# Patient Record
Sex: FEMALE | Race: BLACK OR AFRICAN AMERICAN | NOT HISPANIC OR LATINO | Employment: FULL TIME | URBAN - METROPOLITAN AREA
[De-identification: names, ages, dates, MRNs, and addresses within clinical notes are randomized per-mention and may not be internally consistent; named-entity substitution may affect disease eponyms.]

---

## 2017-04-20 ENCOUNTER — OFFICE VISIT (OUTPATIENT)
Dept: INTERNAL MEDICINE | Facility: CLINIC | Age: 37
End: 2017-04-20

## 2017-04-20 VITALS
HEART RATE: 78 BPM | OXYGEN SATURATION: 98 % | WEIGHT: 235.13 LBS | SYSTOLIC BLOOD PRESSURE: 122 MMHG | TEMPERATURE: 97.1 F | DIASTOLIC BLOOD PRESSURE: 76 MMHG | RESPIRATION RATE: 20 BRPM

## 2017-04-20 DIAGNOSIS — M79.89 LEFT LEG SWELLING: ICD-10-CM

## 2017-04-20 DIAGNOSIS — R53.83 FATIGUE, UNSPECIFIED TYPE: ICD-10-CM

## 2017-04-20 DIAGNOSIS — M79.89 LEFT ARM SWELLING: Primary | ICD-10-CM

## 2017-04-20 DIAGNOSIS — Z13.220 LIPID SCREENING: ICD-10-CM

## 2017-04-20 LAB
EXPIRATION DATE: NORMAL
Lab: NORMAL
POC CREATININE URINE: 200
POC MICROALBUMIN URINE: 10

## 2017-04-20 PROCEDURE — 90471 IMMUNIZATION ADMIN: CPT | Performed by: INTERNAL MEDICINE

## 2017-04-20 PROCEDURE — 99214 OFFICE O/P EST MOD 30 MIN: CPT | Performed by: INTERNAL MEDICINE

## 2017-04-20 PROCEDURE — 90715 TDAP VACCINE 7 YRS/> IM: CPT | Performed by: INTERNAL MEDICINE

## 2017-04-20 PROCEDURE — 82044 UR ALBUMIN SEMIQUANTITATIVE: CPT | Performed by: INTERNAL MEDICINE

## 2017-04-20 NOTE — PROGRESS NOTES
Subjective   Salma Strickland is a 37 y.o. female.     History of Present Illness     Swelling in the left hand and left leg (new issue)  Duration 1 week   Patient says that she has been experiencing swelling in her left upper and lower extremities for the past 1 week.  She woke up one morning and noticed that her hand swollen (patient has provided features on her phone to document this) and she says that her hand felt tight.  She also noticed lower extremity swelling in her legs.  Patient denies any shortness of breath, chest pain, nausea, vomiting, fatigue, fever, chills, + she does mention headache during this time intermittently, but no association with any visual change, photophobia, phonophobia, or any other systemic symptoms.    Family History   Arthritis   No Lupus  No DVT disorders    Social history: No EtOH consumption, no cigarette smoking, no IV drugs, no marijuana.  + Patient recently started a new fruit cleanse drink.    Review of Systems   Constitutional: Positive for fatigue. Negative for appetite change, chills, diaphoresis and fever.   Respiratory: Negative for cough, chest tightness, shortness of breath, wheezing and stridor.    Neurological: Positive for headaches. Negative for dizziness, seizures, syncope, facial asymmetry, speech difficulty, weakness, light-headedness and numbness.       Objective   Physical Exam   Constitutional: She appears well-developed and well-nourished.   HENT:   Head: Normocephalic.   Right Ear: External ear normal.   Left Ear: External ear normal.   Nose: Nose normal.   Mouth/Throat: Oropharynx is clear and moist.   Eyes: Conjunctivae and EOM are normal. Pupils are equal, round, and reactive to light.   Neck: Normal range of motion. Neck supple.   Cardiovascular: Normal rate, regular rhythm and normal heart sounds.    Pulmonary/Chest: Effort normal and breath sounds normal.   Musculoskeletal: Normal range of motion.   Neurological: She is alert.   Skin: Skin is warm.    Psychiatric: She has a normal mood and affect. Her behavior is normal. Thought content normal.   Nursing note and vitals reviewed.      Assessment/Plan   Salma was seen today for leg swelling and headache.    Diagnoses and all orders for this visit:    Left arm swelling  -     EROS    Left leg swelling  -     POC Microalbumin  -     EROS    Lipid screening  -     Lipid Panel    Fatigue, unspecified type  -     CBC (No Diff)  -     Comprehensive Metabolic Panel  -     T4, Free  -     TSH    Symptoms are suggestive of possible positional i.e. lying down or hormonal and less likely risk for DVT.  However, I have instructed patient to continue to monitor symptoms for any worsening clinical symptoms such as shortness breath, chest pain, neurological deficits, for which she should go to the emergency room immediately.  Patient agree with plan.    Will continue to follow.

## 2017-04-21 LAB
ALBUMIN SERPL-MCNC: 4.2 G/DL (ref 3.2–4.8)
ALBUMIN/GLOB SERPL: 1.2 G/DL (ref 1.5–2.5)
ALP SERPL-CCNC: 85 U/L (ref 25–100)
ALT SERPL-CCNC: 17 U/L (ref 7–40)
ANA SER QL: NEGATIVE
AST SERPL-CCNC: 21 U/L (ref 0–33)
BILIRUB SERPL-MCNC: 0.4 MG/DL (ref 0.3–1.2)
BUN SERPL-MCNC: 11 MG/DL (ref 9–23)
BUN/CREAT SERPL: 13.8 (ref 7–25)
CALCIUM SERPL-MCNC: 9.6 MG/DL (ref 8.7–10.4)
CHLORIDE SERPL-SCNC: 106 MMOL/L (ref 99–109)
CHOLEST SERPL-MCNC: 166 MG/DL (ref 0–200)
CO2 SERPL-SCNC: 26 MMOL/L (ref 20–31)
CREAT SERPL-MCNC: 0.8 MG/DL (ref 0.6–1.3)
ERYTHROCYTE [DISTWIDTH] IN BLOOD BY AUTOMATED COUNT: 15 % (ref 11.3–14.5)
GLOBULIN SER CALC-MCNC: 3.5 GM/DL
GLUCOSE SERPL-MCNC: 91 MG/DL (ref 70–100)
HCT VFR BLD AUTO: 36.9 % (ref 34.5–44)
HDLC SERPL-MCNC: 45 MG/DL (ref 40–60)
HGB BLD-MCNC: 11.3 G/DL (ref 11.5–15.5)
LDLC SERPL CALC-MCNC: 114 MG/DL (ref 0–100)
MCH RBC QN AUTO: 23.3 PG (ref 27–31)
MCHC RBC AUTO-ENTMCNC: 30.6 G/DL (ref 32–36)
MCV RBC AUTO: 76.1 FL (ref 80–99)
PLATELET # BLD AUTO: 375 10*3/MM3 (ref 150–450)
POTASSIUM SERPL-SCNC: 4.8 MMOL/L (ref 3.5–5.5)
PROT SERPL-MCNC: 7.7 G/DL (ref 5.7–8.2)
RBC # BLD AUTO: 4.85 10*6/MM3 (ref 3.89–5.14)
SODIUM SERPL-SCNC: 139 MMOL/L (ref 132–146)
T4 FREE SERPL-MCNC: 0.85 NG/DL (ref 0.89–1.76)
TRIGL SERPL-MCNC: 34 MG/DL (ref 0–150)
TSH SERPL DL<=0.005 MIU/L-ACNC: 0.47 MIU/ML (ref 0.35–5.35)
VLDLC SERPL CALC-MCNC: 6.8 MG/DL
WBC # BLD AUTO: 6.15 10*3/MM3 (ref 3.5–10.8)

## 2017-04-22 DIAGNOSIS — R94.6 THYROID FUNCTION TEST ABNORMAL: Primary | ICD-10-CM

## 2017-04-30 DIAGNOSIS — R79.89 ABNORMAL THYROID BLOOD TEST: Primary | ICD-10-CM

## 2017-06-23 ENCOUNTER — TELEPHONE (OUTPATIENT)
Dept: INTERNAL MEDICINE | Facility: CLINIC | Age: 37
End: 2017-06-23

## 2017-06-23 NOTE — TELEPHONE ENCOUNTER
S/w patient informed her of information below she gave verbal understanding stated will come in for labs as soon as possible.

## 2017-06-28 ENCOUNTER — HOSPITAL ENCOUNTER (OUTPATIENT)
Dept: GENERAL RADIOLOGY | Facility: HOSPITAL | Age: 37
Discharge: HOME | End: 2017-06-28
Admitting: NURSE PRACTITIONER

## 2017-06-28 ENCOUNTER — OFFICE VISIT (OUTPATIENT)
Dept: INTERNAL MEDICINE | Facility: CLINIC | Age: 37
End: 2017-06-28

## 2017-06-28 VITALS
DIASTOLIC BLOOD PRESSURE: 84 MMHG | HEART RATE: 84 BPM | SYSTOLIC BLOOD PRESSURE: 136 MMHG | RESPIRATION RATE: 20 BRPM | TEMPERATURE: 98.2 F | WEIGHT: 232 LBS

## 2017-06-28 DIAGNOSIS — R79.89 ABNORMAL THYROID BLOOD TEST: ICD-10-CM

## 2017-06-28 DIAGNOSIS — M54.41 ACUTE MIDLINE LOW BACK PAIN WITH BILATERAL SCIATICA: Primary | ICD-10-CM

## 2017-06-28 DIAGNOSIS — M54.42 ACUTE MIDLINE LOW BACK PAIN WITH BILATERAL SCIATICA: Primary | ICD-10-CM

## 2017-06-28 LAB
T4 FREE SERPL-MCNC: 0.86 NG/DL (ref 0.89–1.76)
TSH SERPL DL<=0.005 MIU/L-ACNC: 0.07 MIU/ML (ref 0.35–5.35)

## 2017-06-28 PROCEDURE — 36415 COLL VENOUS BLD VENIPUNCTURE: CPT | Performed by: INTERNAL MEDICINE

## 2017-06-28 PROCEDURE — 72100 X-RAY EXAM L-S SPINE 2/3 VWS: CPT

## 2017-06-28 PROCEDURE — 96372 THER/PROPH/DIAG INJ SC/IM: CPT | Performed by: NURSE PRACTITIONER

## 2017-06-28 PROCEDURE — 99214 OFFICE O/P EST MOD 30 MIN: CPT | Performed by: NURSE PRACTITIONER

## 2017-06-28 RX ORDER — CYCLOBENZAPRINE HCL 10 MG
10 TABLET ORAL NIGHTLY
Qty: 30 TABLET | Refills: 0 | Status: SHIPPED | OUTPATIENT
Start: 2017-06-28 | End: 2017-11-29

## 2017-06-28 RX ORDER — METHYLPREDNISOLONE 4 MG/1
TABLET ORAL
Qty: 21 TABLET | Refills: 0 | Status: SHIPPED | OUTPATIENT
Start: 2017-06-28 | End: 2017-11-29

## 2017-06-28 RX ORDER — KETOROLAC TROMETHAMINE 30 MG/ML
60 INJECTION, SOLUTION INTRAMUSCULAR; INTRAVENOUS ONCE
Status: COMPLETED | OUTPATIENT
Start: 2017-06-28 | End: 2017-06-28

## 2017-06-28 RX ORDER — DEXAMETHASONE SODIUM PHOSPHATE 4 MG/ML
8 INJECTION, SOLUTION INTRA-ARTICULAR; INTRALESIONAL; INTRAMUSCULAR; INTRAVENOUS; SOFT TISSUE ONCE
Status: COMPLETED | OUTPATIENT
Start: 2017-06-28 | End: 2017-06-28

## 2017-06-28 RX ADMIN — DEXAMETHASONE SODIUM PHOSPHATE 8 MG: 4 INJECTION, SOLUTION INTRA-ARTICULAR; INTRALESIONAL; INTRAMUSCULAR; INTRAVENOUS; SOFT TISSUE at 11:46

## 2017-06-28 RX ADMIN — KETOROLAC TROMETHAMINE 60 MG: 30 INJECTION, SOLUTION INTRAMUSCULAR; INTRAVENOUS at 11:47

## 2017-06-28 NOTE — PATIENT INSTRUCTIONS
Back Pain, Adult  Back pain is very common in adults. The cause of back pain is rarely dangerous and the pain often gets better over time. The cause of your back pain may not be known. Some common causes of back pain include:  · Strain of the muscles or ligaments supporting the spine.  · Wear and tear (degeneration) of the spinal disks.  · Arthritis.  · Direct injury to the back.  For many people, back pain may return. Since back pain is rarely dangerous, most people can learn to manage this condition on their own.  HOME CARE INSTRUCTIONS  Watch your back pain for any changes. The following actions may help to lessen any discomfort you are feeling:  · Remain active. It is stressful on your back to sit or  one place for long periods of time. Do not sit, drive, or  one place for more than 30 minutes at a time. Take short walks on even surfaces as soon as you are able. Try to increase the length of time you walk each day.  · Exercise regularly as directed by your health care provider. Exercise helps your back heal faster. It also helps avoid future injury by keeping your muscles strong and flexible.  · Do not stay in bed. Resting more than 1-2 days can delay your recovery.  · Pay attention to your body when you bend and lift. The most comfortable positions are those that put less stress on your recovering back. Always use proper lifting techniques, including:    Bending your knees.    Keeping the load close to your body.    Avoiding twisting.  · Find a comfortable position to sleep. Use a firm mattress and lie on your side with your knees slightly bent. If you lie on your back, put a pillow under your knees.  · Avoid feeling anxious or stressed. Stress increases muscle tension and can worsen back pain. It is important to recognize when you are anxious or stressed and learn ways to manage it, such as with exercise.  · Take medicines only as directed by your health care provider. Over-the-counter  medicines to reduce pain and inflammation are often the most helpful. Your health care provider may prescribe muscle relaxant drugs. These medicines help dull your pain so you can more quickly return to your normal activities and healthy exercise.  · Apply ice to the injured area:    Put ice in a plastic bag.    Place a towel between your skin and the bag.    Leave the ice on for 20 minutes, 2-3 times a day for the first 2-3 days. After that, ice and heat may be alternated to reduce pain and spasms.  · Maintain a healthy weight. Excess weight puts extra stress on your back and makes it difficult to maintain good posture.  SEEK MEDICAL CARE IF:  · You have pain that is not relieved with rest or medicine.  · You have increasing pain going down into the legs or buttocks.  · You have pain that does not improve in one week.  · You have night pain.  · You lose weight.  · You have a fever or chills.  SEEK IMMEDIATE MEDICAL CARE IF:   · You develop new bowel or bladder control problems.  · You have unusual weakness or numbness in your arms or legs.  · You develop nausea or vomiting.  · You develop abdominal pain.  · You feel faint.     This information is not intended to replace advice given to you by your health care provider. Make sure you discuss any questions you have with your health care provider.     Document Released: 12/18/2006 Document Revised: 01/08/2016 Document Reviewed: 04/21/2015  [employer] Interactive Patient Education ©2017 [employer]

## 2017-06-28 NOTE — PROGRESS NOTES
Chief Complaint   Patient presents with   • Back Pain     x 3 days        Subjective     History of Present Illness   The patient is here today with complaints of back pain.  Past medical history of scoliosis and has been prepared with Rodriguez rods.  She reports that when she delivered her child 9 years ago she had an epidural had a reaction at that time to the epidural but no specific back pain.  The reaction seemed to be more related to the medication.  She reports 3 days ago she awoke to a sharp pain radiating across her back pain generating in the central portion of her back.  Pain shot down bilateral lower extremities.  She reports that she's had no known trauma to her back.  Moving makes it worse especially bending over and getting up while sitting makes it better especially on hard surface.  She's been using moist heat and NSAIDs which has been helping slightly.  She has had tingling in her right upper thigh.    She also is here to have thyroid test repeated for Dr. Gamez      The following portions of the patient's history were reviewed and updated as appropriate: allergies, current medications, past family history, past medical history, past social history, past surgical history and problem list.    Review of Systems  Weakness in lower extremities no fever no change in appetite no sweats back pain as noted above with pain radiating down lower extremities and tingling.  No change in bowel bladder habits  Objective   Physical Exam   Constitutional: She is oriented to person, place, and time. She appears well-developed and well-nourished.   Cardiovascular: Normal rate and regular rhythm.    Pulmonary/Chest: Effort normal and breath sounds normal.   Abdominal: Soft. Bowel sounds are normal.   Musculoskeletal:   Questionable straight leg raise positive she has tenderness across her lower back there is no tightness erythema or rash noted.  She can heel and toe walk however she has discomfort with movement.   Muscle strength 5 out of 5 in all major muscle groups of the lower extremities.   Neurological: She is alert and oriented to person, place, and time.   Skin: Skin is warm and dry.   Psychiatric: She has a normal mood and affect. Her behavior is normal.   Nursing note and vitals reviewed.      Results for orders placed or performed in visit on 06/28/17   T4, Free   Result Value Ref Range    Free T4 0.86 (L) 0.89 - 1.76 ng/dL   TSH   Result Value Ref Range    TSH 0.068 (L) 0.350 - 5.350 mIU/mL        Assessment/Plan   Salma was seen today for back pain.    Diagnoses and all orders for this visit:    Acute midline low back pain with bilateral sciatica  -     XR Spine Lumbar 2 or 3 View (In Office)  -     dexamethasone (DECADRON) injection 8 mg; Inject 2 mL into the shoulder, thigh, or buttocks 1 (One) Time.  -     ketorolac (TORADOL) injection 60 mg; Inject 60 mg into the shoulder, thigh, or buttocks 1 (One) Time.  -     MRI Lumbar Spine With & Without Contrast; Future    Abnormal thyroid blood test  -     T4, Free  -     TSH    Other orders  -     cyclobenzaprine (FLEXERIL) 10 MG tablet; Take 1 tablet by mouth Every Night.  -     MethylPREDNISolone (MEDROL, KAREN,) 4 MG tablet; Take as directed on package instructions.        IF MRI stable will do PT  Follow upFollow-up labs and imaging as made available  RTC/call  If symptoms worsen  Meds MOA and SE's reviewed and pt v/u

## 2017-07-03 ENCOUNTER — HOSPITAL ENCOUNTER (OUTPATIENT)
Dept: MRI IMAGING | Facility: HOSPITAL | Age: 37
Discharge: HOME | End: 2017-07-03
Admitting: NURSE PRACTITIONER

## 2017-07-03 DIAGNOSIS — M54.41 ACUTE MIDLINE LOW BACK PAIN WITH BILATERAL SCIATICA: ICD-10-CM

## 2017-07-03 DIAGNOSIS — M54.42 ACUTE MIDLINE LOW BACK PAIN WITH BILATERAL SCIATICA: ICD-10-CM

## 2017-07-03 PROCEDURE — A9577 INJ MULTIHANCE: HCPCS | Performed by: NURSE PRACTITIONER

## 2017-07-03 PROCEDURE — 72158 MRI LUMBAR SPINE W/O & W/DYE: CPT

## 2017-07-03 PROCEDURE — 0 GADOBENATE DIMEGLUMINE 529 MG/ML SOLUTION: Performed by: NURSE PRACTITIONER

## 2017-07-03 RX ADMIN — GADOBENATE DIMEGLUMINE 20 ML: 529 INJECTION, SOLUTION INTRAVENOUS at 09:45

## 2017-07-06 ENCOUNTER — TELEPHONE (OUTPATIENT)
Dept: INTERNAL MEDICINE | Facility: CLINIC | Age: 37
End: 2017-07-06

## 2017-07-06 DIAGNOSIS — M54.5 LOW BACK PAIN, UNSPECIFIED BACK PAIN LATERALITY, UNSPECIFIED CHRONICITY, WITH SCIATICA PRESENCE UNSPECIFIED: Primary | ICD-10-CM

## 2017-07-06 NOTE — TELEPHONE ENCOUNTER
----- Message from Yoon Lacey sent at 7/6/2017 12:51 PM EDT -----  PT CALLED WANTING THE RESULTS OF HER MRI. SHE CAN BE REACHED -[phone]

## 2017-07-06 NOTE — TELEPHONE ENCOUNTER
----- Message from Lisandra Sung MA sent at 7/6/2017  8:33 AM EDT -----  She had a MRI of Back done 3 days ago .  She would like the results    [phone]

## 2017-07-07 ENCOUNTER — TELEPHONE (OUTPATIENT)
Dept: INTERNAL MEDICINE | Facility: CLINIC | Age: 37
End: 2017-07-07

## 2017-07-07 RX ORDER — LEVOTHYROXINE SODIUM 0.05 MG/1
50 TABLET ORAL DAILY
Qty: 30 TABLET | Refills: 5 | Status: SHIPPED | OUTPATIENT
Start: 2017-07-07 | End: 2018-08-06 | Stop reason: SDUPTHER

## 2017-07-07 NOTE — TELEPHONE ENCOUNTER
----- Message from Yoon Lacey sent at 7/7/2017 10:33 AM EDT -----  PT CALLED AND STATED SHE AGREEABLE TO START MEDICATION FOR HER THYROID AND WANTED TO KNOW WHAT TO START PER LAB LETTER. SHE CAN BE REACHED -[phone]      [facility]

## 2017-07-07 NOTE — TELEPHONE ENCOUNTER
OK,     Please call in Synthroid 50 mcg po qd       #30      5refills        Follow-up back in clinic in approximately 6 weeks.

## 2017-07-10 ENCOUNTER — TELEPHONE (OUTPATIENT)
Dept: INTERNAL MEDICINE | Facility: CLINIC | Age: 37
End: 2017-07-10

## 2017-07-10 NOTE — TELEPHONE ENCOUNTER
----- Message from Yoon Lacey sent at 7/10/2017  1:10 PM EDT -----  Pt dropped off la papers. She can be reached at [phone]

## 2017-07-13 NOTE — TELEPHONE ENCOUNTER
Forms completed and faxed to [employer] [phone].  LMOV [phone] letting pt know. Asked for return call for her to let me know if she would like originals paperwork sent to her home address or place up front for her to p/u.

## 2017-08-03 ENCOUNTER — TELEPHONE (OUTPATIENT)
Dept: INTERNAL MEDICINE | Facility: CLINIC | Age: 37
End: 2017-08-03

## 2017-08-03 NOTE — TELEPHONE ENCOUNTER
----- Message from Gay Armstrong sent at 8/3/2017  2:35 PM EDT -----  [phone]    PT CALLED TO SEE IF [facility] PAPERS HAD BEEN FAXED.  PLEASE CALL AND LET HER KNOW

## 2017-08-05 NOTE — TELEPHONE ENCOUNTER
I have looked everywhere for these in the late papers and do not see them on my desk.    Please have them refax [facility] papers

## 2017-08-07 ENCOUNTER — TELEPHONE (OUTPATIENT)
Dept: INTERNAL MEDICINE | Facility: CLINIC | Age: 37
End: 2017-08-07

## 2017-08-07 NOTE — TELEPHONE ENCOUNTER
----- Message from Annamaria Jenkins sent at 8/3/2017 11:41 AM EDT -----  Patient states her FMLA has been denied due to the answers we put on her paperwork.  Wants us to either resubmit her paperwork or call [facility] and give information verbally.  Patient states we should have that info in her chart.  Patient can be reached at [phone]

## 2017-08-07 NOTE — TELEPHONE ENCOUNTER
Spoke with Pt and she states this was being faxed to MIRELLA Gonsalez as she is the one seeing the Pt. Suellen, please contact Pt with status.

## 2017-08-07 NOTE — TELEPHONE ENCOUNTER
Spoke with pt. States that her initial [facility] paperwork was denied due to not having a specific time period that she was off of work. States that time frame needs to be written as 06/23/2017 to 07/05/2017.

## 2017-08-08 NOTE — TELEPHONE ENCOUNTER
Question.  Does pt have pt and the dates are denied and need to be place for the specific dates she has asked or do we have papers???

## 2017-08-16 ENCOUNTER — TELEPHONE (OUTPATIENT)
Dept: INTERNAL MEDICINE | Facility: CLINIC | Age: 37
End: 2017-08-16

## 2017-08-17 NOTE — TELEPHONE ENCOUNTER
Forms faxed to [facility] [employer]. Confirmation received. LMOV [phone] letting pt know. Office # given for any further questions/ concerns.

## 2017-11-29 ENCOUNTER — OFFICE VISIT (OUTPATIENT)
Dept: INTERNAL MEDICINE | Facility: CLINIC | Age: 37
End: 2017-11-29

## 2017-11-29 VITALS
TEMPERATURE: 98 F | HEART RATE: 82 BPM | SYSTOLIC BLOOD PRESSURE: 118 MMHG | WEIGHT: 222 LBS | DIASTOLIC BLOOD PRESSURE: 76 MMHG | BODY MASS INDEX: 32.78 KG/M2 | RESPIRATION RATE: 18 BRPM

## 2017-11-29 DIAGNOSIS — R68.89 FLU-LIKE SYMPTOMS: Primary | ICD-10-CM

## 2017-11-29 DIAGNOSIS — J10.1 INFLUENZA A: ICD-10-CM

## 2017-11-29 LAB
EXPIRATION DATE: NORMAL
FLUAV AG NPH QL: POSITIVE
FLUBV AG NPH QL: NEGATIVE
INTERNAL CONTROL: NORMAL
Lab: NORMAL

## 2017-11-29 PROCEDURE — 87804 INFLUENZA ASSAY W/OPTIC: CPT | Performed by: NURSE PRACTITIONER

## 2017-11-29 PROCEDURE — 99213 OFFICE O/P EST LOW 20 MIN: CPT | Performed by: NURSE PRACTITIONER

## 2017-11-29 RX ORDER — LEVOTHYROXINE SODIUM 0.05 MG/1
50 TABLET ORAL DAILY
COMMUNITY
End: 2021-10-06

## 2017-11-29 RX ORDER — OSELTAMIVIR PHOSPHATE 75 MG/1
75 CAPSULE ORAL 2 TIMES DAILY
Qty: 10 CAPSULE | Refills: 0 | Status: SHIPPED | OUTPATIENT
Start: 2017-11-29 | End: 2018-04-03

## 2018-04-03 ENCOUNTER — OFFICE VISIT (OUTPATIENT)
Dept: INTERNAL MEDICINE | Facility: CLINIC | Age: 38
End: 2018-04-03

## 2018-04-03 VITALS
DIASTOLIC BLOOD PRESSURE: 90 MMHG | TEMPERATURE: 98 F | SYSTOLIC BLOOD PRESSURE: 128 MMHG | WEIGHT: 232 LBS | BODY MASS INDEX: 34.26 KG/M2 | RESPIRATION RATE: 18 BRPM | HEART RATE: 64 BPM

## 2018-04-03 DIAGNOSIS — J01.90 ACUTE SINUSITIS, RECURRENCE NOT SPECIFIED, UNSPECIFIED LOCATION: Primary | ICD-10-CM

## 2018-04-03 DIAGNOSIS — J02.9 SORE THROAT: ICD-10-CM

## 2018-04-03 DIAGNOSIS — R05.9 COUGH: ICD-10-CM

## 2018-04-03 LAB
EXPIRATION DATE: NORMAL
EXPIRATION DATE: NORMAL
FLUAV AG NPH QL: NEGATIVE
FLUBV AG NPH QL: NEGATIVE
INTERNAL CONTROL: NORMAL
INTERNAL CONTROL: NORMAL
Lab: NORMAL
Lab: NORMAL
S PYO AG THROAT QL: NEGATIVE

## 2018-04-03 PROCEDURE — 87880 STREP A ASSAY W/OPTIC: CPT | Performed by: NURSE PRACTITIONER

## 2018-04-03 PROCEDURE — 87804 INFLUENZA ASSAY W/OPTIC: CPT | Performed by: NURSE PRACTITIONER

## 2018-04-03 PROCEDURE — 99214 OFFICE O/P EST MOD 30 MIN: CPT | Performed by: NURSE PRACTITIONER

## 2018-04-03 RX ORDER — AMOXICILLIN AND CLAVULANATE POTASSIUM 875; 125 MG/1; MG/1
1 TABLET, FILM COATED ORAL EVERY 12 HOURS SCHEDULED
Qty: 20 TABLET | Refills: 0 | Status: SHIPPED | OUTPATIENT
Start: 2018-04-03 | End: 2021-10-06

## 2018-04-03 RX ORDER — BENZONATATE 100 MG/1
100 CAPSULE ORAL 3 TIMES DAILY PRN
Qty: 21 CAPSULE | Refills: 0 | Status: SHIPPED | OUTPATIENT
Start: 2018-04-03 | End: 2021-10-06

## 2018-04-03 NOTE — PROGRESS NOTES
Subjective:    Salma Strickland is a 38 y.o. female.     Chief Complaint   Patient presents with   • Cough   • Nasal Congestion   • Sore Throat       History of Present Illness   Patient complains of cough, congestion and sore throat that began Friday afer work. Was miserable with body aches and pounding headache. Reports cough drops and drinking Theraflu gave some relief.   Still has runny nose, sore throat and cough-productive with thick yellow mucus. Cough has persisted all day and night since Friday. Intermittent fever relieved by acetaminophen. Swallowing worsens constant throat pain-no relief identified. Hot tea has not helped. Headache resolved on Sunday. Patient states she is going on vacation this weekend and needs to feel better.    Current Outpatient Prescriptions:   •  levothyroxine (SYNTHROID, LEVOTHROID) 50 MCG tablet, Take 50 mcg by mouth Daily., Disp: , Rfl:   •  metFORMIN (GLUCOPHAGE) 500 MG tablet, Take 500 mg by mouth 2 (Two) Times a Day With Meals., Disp: , Rfl:   •  amoxicillin-clavulanate (AUGMENTIN) 875-125 MG per tablet, Take 1 tablet by mouth Every 12 (Twelve) Hours., Disp: 20 tablet, Rfl: 0  •  benzonatate (TESSALON PERLES) 100 MG capsule, Take 1 capsule by mouth 3 (Three) Times a Day As Needed for Cough., Disp: 21 capsule, Rfl: 0     The following portions of the patient's history were reviewed and updated as appropriate: allergies, current medications, past family history, past medical history, past social history, past surgical history and problem list.    Review of Systems   Constitutional: Positive for chills, fatigue and fever.   HENT: Positive for congestion, rhinorrhea, sinus pressure, sneezing and sore throat. Negative for ear pain and postnasal drip.    Eyes: Negative for pain, discharge, redness and itching.   Respiratory: Positive for cough. Negative for chest tightness, shortness of breath and wheezing.    Cardiovascular: Negative for chest pain.   Gastrointestinal: Negative  for abdominal pain, diarrhea, nausea and vomiting.   Endocrine: Negative.    Genitourinary: Negative.    Musculoskeletal: Negative for arthralgias and myalgias.   Skin: Negative for rash.   Allergic/Immunologic: Negative.    Neurological: Negative for headaches.   Hematological: Negative for adenopathy.   Psychiatric/Behavioral: Negative.        Objective:    /90 (BP Location: Left arm, Patient Position: Sitting)   Pulse 64   Temp 98 °F (36.7 °C) (Temporal Artery )   Resp 18   Wt 105 kg (232 lb)   BMI 34.26 kg/m²     Physical Exam   Constitutional: She is oriented to person, place, and time. She appears well-developed and well-nourished.   HENT:   Head: Normocephalic and atraumatic.   Right Ear: Tympanic membrane, external ear and ear canal normal.   Left Ear: Tympanic membrane, external ear and ear canal normal.   Nose: Mucosal edema present. Right sinus exhibits frontal sinus tenderness. Right sinus exhibits no maxillary sinus tenderness. Left sinus exhibits frontal sinus tenderness. Left sinus exhibits no maxillary sinus tenderness.   Mouth/Throat: Mucous membranes are normal. No oral lesions. Posterior oropharyngeal erythema present.   Nasal congestion.   Eyes: Conjunctivae and lids are normal.   Neck: Normal range of motion. Neck supple.   Cardiovascular: Normal rate and regular rhythm.    No murmur heard.  Pulmonary/Chest: Effort normal and breath sounds normal.   Non productive cough.   Abdominal: Soft. There is no hepatosplenomegaly. There is no tenderness.   Musculoskeletal: Normal range of motion.   Lymphadenopathy:     She has no cervical adenopathy.   Neurological: She is alert and oriented to person, place, and time. She has normal strength.   Skin: Skin is warm, dry and intact. No rash noted.   Psychiatric: She has a normal mood and affect. Her speech is normal and behavior is normal. Thought content normal.   Nursing note and vitals reviewed.      Assessment/Plan:    Salma was seen today  for cough, nasal congestion and sore throat.    Diagnoses and all orders for this visit:    Acute sinusitis, recurrence not specified, unspecified location  -     amoxicillin-clavulanate (AUGMENTIN) 875-125 MG per tablet; Take 1 tablet by mouth Every 12 (Twelve) Hours.    Cough  -     benzonatate (TESSALON PERLES) 100 MG capsule; Take 1 capsule by mouth 3 (Three) Times a Day As Needed for Cough.  -     POC Influenza A / B    Sore throat  -     POC Rapid Strep A    Discussed negative strep and flu test.    Return if symptoms worsen or fail to improve.

## 2018-08-06 RX ORDER — LEVOTHYROXINE SODIUM 0.05 MG/1
TABLET ORAL
Qty: 30 TABLET | Refills: 5 | Status: SHIPPED | OUTPATIENT
Start: 2018-08-06 | End: 2021-10-06

## 2021-10-06 ENCOUNTER — OFFICE VISIT (OUTPATIENT)
Dept: INTERNAL MEDICINE | Facility: CLINIC | Age: 41
End: 2021-10-06

## 2021-10-06 ENCOUNTER — LAB (OUTPATIENT)
Dept: LAB | Facility: HOSPITAL | Age: 41
End: 2021-10-06

## 2021-10-06 VITALS
BODY MASS INDEX: 31.71 KG/M2 | RESPIRATION RATE: 20 BRPM | DIASTOLIC BLOOD PRESSURE: 90 MMHG | TEMPERATURE: 97.8 F | WEIGHT: 221.5 LBS | HEIGHT: 70 IN | OXYGEN SATURATION: 99 % | SYSTOLIC BLOOD PRESSURE: 130 MMHG | HEART RATE: 72 BPM

## 2021-10-06 DIAGNOSIS — Z13.1 DIABETES MELLITUS SCREENING: ICD-10-CM

## 2021-10-06 DIAGNOSIS — M79.89 RIGHT LEG SWELLING: Primary | ICD-10-CM

## 2021-10-06 DIAGNOSIS — R13.19 ESOPHAGEAL DYSPHAGIA: ICD-10-CM

## 2021-10-06 LAB
A/C: NORMAL
ALBUMIN SERPL-MCNC: 4.4 G/DL (ref 3.5–5.2)
ALBUMIN/GLOB SERPL: 1.3 G/DL
ALP SERPL-CCNC: 105 U/L (ref 39–117)
ALT SERPL W P-5'-P-CCNC: 11 U/L (ref 1–33)
ANION GAP SERPL CALCULATED.3IONS-SCNC: 7.3 MMOL/L (ref 5–15)
AST SERPL-CCNC: 14 U/L (ref 1–32)
BILIRUB SERPL-MCNC: 0.2 MG/DL (ref 0–1.2)
BUN SERPL-MCNC: 9 MG/DL (ref 6–20)
BUN/CREAT SERPL: 10.3 (ref 7–25)
CALCIUM SPEC-SCNC: 9.1 MG/DL (ref 8.6–10.5)
CHLORIDE SERPL-SCNC: 105 MMOL/L (ref 98–107)
CHOLEST SERPL-MCNC: 160 MG/DL (ref 0–200)
CO2 SERPL-SCNC: 26.7 MMOL/L (ref 22–29)
CREAT SERPL-MCNC: 0.87 MG/DL (ref 0.57–1)
DEPRECATED RDW RBC AUTO: 36.5 FL (ref 37–54)
ERYTHROCYTE [DISTWIDTH] IN BLOOD BY AUTOMATED COUNT: 13.6 % (ref 12.3–15.4)
EXPIRATION DATE: NORMAL
GFR SERPL CREATININE-BSD FRML MDRD: 87 ML/MIN/1.73
GLOBULIN UR ELPH-MCNC: 3.3 GM/DL
GLUCOSE BLDC GLUCOMTR-MCNC: 89 MG/DL (ref 70–130)
GLUCOSE SERPL-MCNC: 76 MG/DL (ref 65–99)
HBA1C MFR BLD: 5.4 %
HCT VFR BLD AUTO: 35.9 % (ref 34–46.6)
HDLC SERPL-MCNC: 40 MG/DL (ref 40–60)
HGB BLD-MCNC: 11.3 G/DL (ref 12–15.9)
LDLC SERPL CALC-MCNC: 111 MG/DL (ref 0–100)
LDLC/HDLC SERPL: 2.79 {RATIO}
Lab: NORMAL
MCH RBC QN AUTO: 23.5 PG (ref 26.6–33)
MCHC RBC AUTO-ENTMCNC: 31.5 G/DL (ref 31.5–35.7)
MCV RBC AUTO: 74.6 FL (ref 79–97)
PLATELET # BLD AUTO: 386 10*3/MM3 (ref 140–450)
PMV BLD AUTO: 9.9 FL (ref 6–12)
POC CREATININE URINE: NORMAL
POC MICROALBUMIN URINE: NORMAL
POTASSIUM SERPL-SCNC: 3.5 MMOL/L (ref 3.5–5.2)
PROT SERPL-MCNC: 7.7 G/DL (ref 6–8.5)
RBC # BLD AUTO: 4.81 10*6/MM3 (ref 3.77–5.28)
SODIUM SERPL-SCNC: 139 MMOL/L (ref 136–145)
TRIGL SERPL-MCNC: 43 MG/DL (ref 0–150)
VLDLC SERPL-MCNC: 9 MG/DL (ref 5–40)
WBC # BLD AUTO: 5.65 10*3/MM3 (ref 3.4–10.8)

## 2021-10-06 PROCEDURE — 83036 HEMOGLOBIN GLYCOSYLATED A1C: CPT | Performed by: INTERNAL MEDICINE

## 2021-10-06 PROCEDURE — 99204 OFFICE O/P NEW MOD 45 MIN: CPT | Performed by: INTERNAL MEDICINE

## 2021-10-06 PROCEDURE — 85027 COMPLETE CBC AUTOMATED: CPT

## 2021-10-06 PROCEDURE — 84443 ASSAY THYROID STIM HORMONE: CPT

## 2021-10-06 PROCEDURE — 84439 ASSAY OF FREE THYROXINE: CPT

## 2021-10-06 PROCEDURE — 80053 COMPREHEN METABOLIC PANEL: CPT

## 2021-10-06 PROCEDURE — 82044 UR ALBUMIN SEMIQUANTITATIVE: CPT | Performed by: INTERNAL MEDICINE

## 2021-10-06 PROCEDURE — 80061 LIPID PANEL: CPT

## 2021-10-06 NOTE — PROGRESS NOTES
"Chief Complaint  Leg Pain and Leg Swelling (Right leg)    Subjective    Salma Strickland is a 41 y.o. female.     Salma Strickland presents to [facility] for       History of Present Illness    The following portions of the patient's history were reviewed and updated as appropriate: allergies, current medications, past family history, past medical history, past social history, past surgical history and problem list.    1 right leg swelling-patient says that she has been having intermittent episodes of right leg swelling compared to left.  No fever, no chills, no nausea, no vomiting, no shortness of breath, no other systemic signs.  Patient says that it has been a little bit more exaggerated but overall wanted to bring it to her attention    2 dyshpagia  Duration 1-2 months  Sx Patient says that it has been going for least 2 months where she would have difficulty with swallowing where she feels that the food gets caught midway in the upper throat and slowly makes his way down the esophagus and feels like it is \"stuck \"midsternal.  Sometimes the sensation can be there for hours until it finally goes down    3 diabetes [place]-patient says that she is been having polyuria, polydipsia and increased thirst over the past several weeks and would like to be tested for diabetes    Review of Systems    Objective   Vital Signs:   /90   Pulse 72   Temp 97.8 °F (36.6 °C) (Temporal)   Resp 20   Ht 176.5 cm (69.5\")   Wt 100 kg (221 lb 8 oz)   SpO2 99%   BMI 32.24 kg/m²     Body mass index is 32.24 kg/m².    Physical Exam          Assessment and Plan  Diagnoses and all orders for this visit:        Diagnoses and all orders for this visit:  Spent 30 minutes face-to-face with patient    1. Right leg swelling (Primary)    2. Esophageal dysphagia  -     FL esophagram complete; Future    3. Diabetes mellitus screening  -     CBC (No Diff); Future  -     Comprehensive " Metabolic Panel; Future  -     T4, Free; Future  -     TSH; Future  -     Lipid Panel; Future  -     POC Glycosylated Hemoglobin (Hb A1C)  -     POC Microalbumin  -     POC Glucose

## 2021-10-07 LAB
T4 FREE SERPL-MCNC: 1 NG/DL (ref 0.93–1.7)
TSH SERPL DL<=0.05 MIU/L-ACNC: 0.72 UIU/ML (ref 0.27–4.2)

## 2021-10-13 ENCOUNTER — APPOINTMENT (OUTPATIENT)
Dept: GENERAL RADIOLOGY | Facility: HOSPITAL | Age: 41
End: 2021-10-13

## 2021-10-13 ENCOUNTER — TELEPHONE (OUTPATIENT)
Dept: INTERNAL MEDICINE | Facility: CLINIC | Age: 41
End: 2021-10-13

## 2021-10-13 DIAGNOSIS — Z01.818 PRE-PROCEDURAL EXAMINATION: Primary | ICD-10-CM

## 2021-10-13 NOTE — TELEPHONE ENCOUNTER
I have pended a COVID test order as required with   Please route message back to me when signed so I'm aware

## 2021-10-26 ENCOUNTER — TELEPHONE (OUTPATIENT)
Dept: INTERNAL MEDICINE | Facility: CLINIC | Age: 41
End: 2021-10-26

## 2021-10-26 NOTE — ADDENDUM NOTE
Addended by: DEMETRIUS JOHNSON on: 10/26/2021 01:53 PM     Modules accepted: Level of Service, SmartSet

## 2023-10-03 ENCOUNTER — ANESTHESIA EVENT (OUTPATIENT)
Dept: PERIOP | Facility: HOSPITAL | Age: 43
DRG: 854 | End: 2023-10-03
Payer: MEDICAID

## 2023-10-03 ENCOUNTER — TELEPHONE (OUTPATIENT)
Dept: EMERGENCY DEPT | Facility: HOSPITAL | Age: 43
End: 2023-10-03
Payer: MEDICAID

## 2023-10-03 ENCOUNTER — APPOINTMENT (OUTPATIENT)
Dept: GENERAL RADIOLOGY | Facility: HOSPITAL | Age: 43
DRG: 854 | End: 2023-10-03
Payer: MEDICAID

## 2023-10-03 ENCOUNTER — APPOINTMENT (OUTPATIENT)
Dept: CT IMAGING | Facility: HOSPITAL | Age: 43
DRG: 854 | End: 2023-10-03
Payer: MEDICAID

## 2023-10-03 ENCOUNTER — ANESTHESIA (OUTPATIENT)
Dept: PERIOP | Facility: HOSPITAL | Age: 43
DRG: 854 | End: 2023-10-03
Payer: MEDICAID

## 2023-10-03 ENCOUNTER — HOSPITAL ENCOUNTER (INPATIENT)
Facility: HOSPITAL | Age: 43
LOS: 5 days | Discharge: HOME | DRG: 854 | End: 2023-10-08
Attending: EMERGENCY MEDICINE | Admitting: INTERNAL MEDICINE
Payer: MEDICAID

## 2023-10-03 ENCOUNTER — HOSPITAL ENCOUNTER (EMERGENCY)
Facility: HOSPITAL | Age: 43
Discharge: HOME | DRG: 854 | End: 2023-10-03
Attending: EMERGENCY MEDICINE | Admitting: EMERGENCY MEDICINE
Payer: MEDICAID

## 2023-10-03 VITALS
WEIGHT: 225 LBS | RESPIRATION RATE: 18 BRPM | OXYGEN SATURATION: 97 % | DIASTOLIC BLOOD PRESSURE: 85 MMHG | SYSTOLIC BLOOD PRESSURE: 120 MMHG | HEART RATE: 106 BPM | BODY MASS INDEX: 33.33 KG/M2 | HEIGHT: 69 IN | TEMPERATURE: 98.7 F

## 2023-10-03 DIAGNOSIS — A41.9 ACUTE SEPSIS: Primary | ICD-10-CM

## 2023-10-03 DIAGNOSIS — R78.81 BACTEREMIA: ICD-10-CM

## 2023-10-03 DIAGNOSIS — N20.1 LEFT URETERAL STONE: Primary | ICD-10-CM

## 2023-10-03 DIAGNOSIS — N13.2 URETERAL STONE WITH HYDRONEPHROSIS: ICD-10-CM

## 2023-10-03 DIAGNOSIS — N20.0 KIDNEY STONE: ICD-10-CM

## 2023-10-03 DIAGNOSIS — N20.0 NEPHROLITHIASIS: ICD-10-CM

## 2023-10-03 DIAGNOSIS — N12 PYELONEPHRITIS: ICD-10-CM

## 2023-10-03 LAB
ALBUMIN SERPL-MCNC: 3.8 G/DL (ref 3.5–5.2)
ALBUMIN SERPL-MCNC: 3.9 G/DL (ref 3.5–5.2)
ALBUMIN/GLOB SERPL: 1.1 G/DL
ALBUMIN/GLOB SERPL: 1.3 G/DL
ALP SERPL-CCNC: 76 U/L (ref 39–117)
ALP SERPL-CCNC: 82 U/L (ref 39–117)
ALT SERPL W P-5'-P-CCNC: 12 U/L (ref 1–33)
ALT SERPL W P-5'-P-CCNC: 9 U/L (ref 1–33)
ANION GAP SERPL CALCULATED.3IONS-SCNC: 10 MMOL/L (ref 5–15)
ANION GAP SERPL CALCULATED.3IONS-SCNC: 12 MMOL/L (ref 5–15)
AST SERPL-CCNC: 19 U/L (ref 1–32)
AST SERPL-CCNC: 22 U/L (ref 1–32)
B-HCG UR QL: NEGATIVE
BASOPHILS # BLD AUTO: 0.02 10*3/MM3 (ref 0–0.2)
BASOPHILS # BLD AUTO: 0.02 10*3/MM3 (ref 0–0.2)
BASOPHILS NFR BLD AUTO: 0.1 % (ref 0–1.5)
BASOPHILS NFR BLD AUTO: 0.1 % (ref 0–1.5)
BILIRUB SERPL-MCNC: 0.5 MG/DL (ref 0–1.2)
BILIRUB SERPL-MCNC: 0.6 MG/DL (ref 0–1.2)
BILIRUB UR QL STRIP: NEGATIVE
BUN SERPL-MCNC: 13 MG/DL (ref 6–20)
BUN SERPL-MCNC: 18 MG/DL (ref 6–20)
BUN/CREAT SERPL: 10.4 (ref 7–25)
BUN/CREAT SERPL: 9.6 (ref 7–25)
CALCIUM SPEC-SCNC: 8.6 MG/DL (ref 8.6–10.5)
CALCIUM SPEC-SCNC: 8.8 MG/DL (ref 8.6–10.5)
CHLORIDE SERPL-SCNC: 101 MMOL/L (ref 98–107)
CHLORIDE SERPL-SCNC: 99 MMOL/L (ref 98–107)
CLARITY UR: CLEAR
CO2 SERPL-SCNC: 22 MMOL/L (ref 22–29)
CO2 SERPL-SCNC: 25 MMOL/L (ref 22–29)
COLOR UR: YELLOW
CREAT SERPL-MCNC: 1.36 MG/DL (ref 0.57–1)
CREAT SERPL-MCNC: 1.73 MG/DL (ref 0.57–1)
D-LACTATE SERPL-SCNC: 1.4 MMOL/L (ref 0.5–2)
D-LACTATE SERPL-SCNC: 1.8 MMOL/L (ref 0.5–2)
DEPRECATED RDW RBC AUTO: 38.7 FL (ref 37–54)
DEPRECATED RDW RBC AUTO: 38.8 FL (ref 37–54)
EGFRCR SERPLBLD CKD-EPI 2021: 37.4 ML/MIN/1.73
EGFRCR SERPLBLD CKD-EPI 2021: 50 ML/MIN/1.73
EOSINOPHIL # BLD AUTO: 0 10*3/MM3 (ref 0–0.4)
EOSINOPHIL # BLD AUTO: 0.02 10*3/MM3 (ref 0–0.4)
EOSINOPHIL NFR BLD AUTO: 0 % (ref 0.3–6.2)
EOSINOPHIL NFR BLD AUTO: 0.1 % (ref 0.3–6.2)
ERYTHROCYTE [DISTWIDTH] IN BLOOD BY AUTOMATED COUNT: 14.4 % (ref 12.3–15.4)
ERYTHROCYTE [DISTWIDTH] IN BLOOD BY AUTOMATED COUNT: 14.6 % (ref 12.3–15.4)
GLOBULIN UR ELPH-MCNC: 2.9 GM/DL
GLOBULIN UR ELPH-MCNC: 3.6 GM/DL
GLUCOSE SERPL-MCNC: 125 MG/DL (ref 65–99)
GLUCOSE SERPL-MCNC: 129 MG/DL (ref 65–99)
GLUCOSE UR STRIP-MCNC: NEGATIVE MG/DL
HCT VFR BLD AUTO: 34.1 % (ref 34–46.6)
HCT VFR BLD AUTO: 36.3 % (ref 34–46.6)
HGB BLD-MCNC: 10.7 G/DL (ref 12–15.9)
HGB BLD-MCNC: 11.4 G/DL (ref 12–15.9)
HGB UR QL STRIP.AUTO: NEGATIVE
HOLD SPECIMEN: NORMAL
IMM GRANULOCYTES # BLD AUTO: 0.06 10*3/MM3 (ref 0–0.05)
IMM GRANULOCYTES # BLD AUTO: 0.08 10*3/MM3 (ref 0–0.05)
IMM GRANULOCYTES NFR BLD AUTO: 0.4 % (ref 0–0.5)
IMM GRANULOCYTES NFR BLD AUTO: 0.5 % (ref 0–0.5)
KETONES UR QL STRIP: NEGATIVE
LEUKOCYTE ESTERASE UR QL STRIP.AUTO: NEGATIVE
LIPASE SERPL-CCNC: 31 U/L (ref 13–60)
LYMPHOCYTES # BLD AUTO: 0.51 10*3/MM3 (ref 0.7–3.1)
LYMPHOCYTES # BLD AUTO: 0.77 10*3/MM3 (ref 0.7–3.1)
LYMPHOCYTES NFR BLD AUTO: 3.1 % (ref 19.6–45.3)
LYMPHOCYTES NFR BLD AUTO: 4.5 % (ref 19.6–45.3)
MCH RBC QN AUTO: 23.4 PG (ref 26.6–33)
MCH RBC QN AUTO: 23.7 PG (ref 26.6–33)
MCHC RBC AUTO-ENTMCNC: 31.4 G/DL (ref 31.5–35.7)
MCHC RBC AUTO-ENTMCNC: 31.4 G/DL (ref 31.5–35.7)
MCV RBC AUTO: 74.6 FL (ref 79–97)
MCV RBC AUTO: 75.5 FL (ref 79–97)
MONOCYTES # BLD AUTO: 0.45 10*3/MM3 (ref 0.1–0.9)
MONOCYTES # BLD AUTO: 0.96 10*3/MM3 (ref 0.1–0.9)
MONOCYTES NFR BLD AUTO: 2.8 % (ref 5–12)
MONOCYTES NFR BLD AUTO: 5.6 % (ref 5–12)
NEUTROPHILS NFR BLD AUTO: 15.18 10*3/MM3 (ref 1.7–7)
NEUTROPHILS NFR BLD AUTO: 15.3 10*3/MM3 (ref 1.7–7)
NEUTROPHILS NFR BLD AUTO: 89.3 % (ref 42.7–76)
NEUTROPHILS NFR BLD AUTO: 93.5 % (ref 42.7–76)
NITRITE UR QL STRIP: NEGATIVE
NRBC BLD AUTO-RTO: 0 /100 WBC (ref 0–0.2)
NRBC BLD AUTO-RTO: 0 /100 WBC (ref 0–0.2)
PH UR STRIP.AUTO: 7 [PH] (ref 5–8)
PLATELET # BLD AUTO: 287 10*3/MM3 (ref 140–450)
PLATELET # BLD AUTO: 356 10*3/MM3 (ref 140–450)
PMV BLD AUTO: 8.9 FL (ref 6–12)
PMV BLD AUTO: 9.1 FL (ref 6–12)
POTASSIUM SERPL-SCNC: 3.8 MMOL/L (ref 3.5–5.2)
POTASSIUM SERPL-SCNC: 4.1 MMOL/L (ref 3.5–5.2)
PROT SERPL-MCNC: 6.7 G/DL (ref 6–8.5)
PROT SERPL-MCNC: 7.5 G/DL (ref 6–8.5)
PROT UR QL STRIP: NEGATIVE
RBC # BLD AUTO: 4.57 10*6/MM3 (ref 3.77–5.28)
RBC # BLD AUTO: 4.81 10*6/MM3 (ref 3.77–5.28)
SODIUM SERPL-SCNC: 133 MMOL/L (ref 136–145)
SODIUM SERPL-SCNC: 136 MMOL/L (ref 136–145)
SP GR UR STRIP: 1.02 (ref 1–1.03)
UROBILINOGEN UR QL STRIP: NORMAL
WBC NRBC COR # BLD: 16.24 10*3/MM3 (ref 3.4–10.8)
WBC NRBC COR # BLD: 17.13 10*3/MM3 (ref 3.4–10.8)
WHOLE BLOOD HOLD COAG: NORMAL
WHOLE BLOOD HOLD COAG: NORMAL
WHOLE BLOOD HOLD SPECIMEN: NORMAL
WHOLE BLOOD HOLD SPECIMEN: NORMAL

## 2023-10-03 PROCEDURE — 36415 COLL VENOUS BLD VENIPUNCTURE: CPT

## 2023-10-03 PROCEDURE — 83690 ASSAY OF LIPASE: CPT | Performed by: EMERGENCY MEDICINE

## 2023-10-03 PROCEDURE — 99285 EMERGENCY DEPT VISIT HI MDM: CPT

## 2023-10-03 PROCEDURE — 87186 SC STD MICRODIL/AGAR DIL: CPT | Performed by: UROLOGY

## 2023-10-03 PROCEDURE — 87086 URINE CULTURE/COLONY COUNT: CPT | Performed by: UROLOGY

## 2023-10-03 PROCEDURE — 80053 COMPREHEN METABOLIC PANEL: CPT | Performed by: EMERGENCY MEDICINE

## 2023-10-03 PROCEDURE — 87186 SC STD MICRODIL/AGAR DIL: CPT | Performed by: EMERGENCY MEDICINE

## 2023-10-03 PROCEDURE — 25010000002 PROPOFOL 10 MG/ML EMULSION: Performed by: ANESTHESIOLOGY

## 2023-10-03 PROCEDURE — 25810000003 LACTATED RINGERS SOLUTION: Performed by: EMERGENCY MEDICINE

## 2023-10-03 PROCEDURE — 81025 URINE PREGNANCY TEST: CPT | Performed by: EMERGENCY MEDICINE

## 2023-10-03 PROCEDURE — 87150 DNA/RNA AMPLIFIED PROBE: CPT | Performed by: EMERGENCY MEDICINE

## 2023-10-03 PROCEDURE — 81003 URINALYSIS AUTO W/O SCOPE: CPT

## 2023-10-03 PROCEDURE — 25010000002 DEXAMETHASONE PER 1 MG: Performed by: ANESTHESIOLOGY

## 2023-10-03 PROCEDURE — 87040 BLOOD CULTURE FOR BACTERIA: CPT | Performed by: EMERGENCY MEDICINE

## 2023-10-03 PROCEDURE — 83605 ASSAY OF LACTIC ACID: CPT | Performed by: EMERGENCY MEDICINE

## 2023-10-03 PROCEDURE — 25010000002 ONDANSETRON PER 1 MG: Performed by: ANESTHESIOLOGY

## 2023-10-03 PROCEDURE — 25010000002 ONDANSETRON PER 1 MG: Performed by: EMERGENCY MEDICINE

## 2023-10-03 PROCEDURE — 25810000003 LACTATED RINGERS PER 1000 ML: Performed by: ANESTHESIOLOGY

## 2023-10-03 PROCEDURE — C1769 GUIDE WIRE: HCPCS | Performed by: UROLOGY

## 2023-10-03 PROCEDURE — 82746 ASSAY OF FOLIC ACID SERUM: CPT | Performed by: PHYSICIAN ASSISTANT

## 2023-10-03 PROCEDURE — C1758 CATHETER, URETERAL: HCPCS | Performed by: UROLOGY

## 2023-10-03 PROCEDURE — 25510000001 IOPAMIDOL 61 % SOLUTION: Performed by: EMERGENCY MEDICINE

## 2023-10-03 PROCEDURE — 82607 VITAMIN B-12: CPT | Performed by: PHYSICIAN ASSISTANT

## 2023-10-03 PROCEDURE — 96374 THER/PROPH/DIAG INJ IV PUSH: CPT

## 2023-10-03 PROCEDURE — 25010000002 FENTANYL CITRATE (PF) 100 MCG/2ML SOLUTION: Performed by: ANESTHESIOLOGY

## 2023-10-03 PROCEDURE — BT1F1ZZ FLUOROSCOPY OF LEFT KIDNEY, URETER AND BLADDER USING LOW OSMOLAR CONTRAST: ICD-10-PCS | Performed by: UROLOGY

## 2023-10-03 PROCEDURE — 83036 HEMOGLOBIN GLYCOSYLATED A1C: CPT | Performed by: PHYSICIAN ASSISTANT

## 2023-10-03 PROCEDURE — 25010000002 CEFTRIAXONE PER 250 MG: Performed by: EMERGENCY MEDICINE

## 2023-10-03 PROCEDURE — 87077 CULTURE AEROBIC IDENTIFY: CPT | Performed by: UROLOGY

## 2023-10-03 PROCEDURE — 99223 1ST HOSP IP/OBS HIGH 75: CPT | Performed by: INTERNAL MEDICINE

## 2023-10-03 PROCEDURE — 25810000003 SODIUM CHLORIDE 0.9 % SOLUTION: Performed by: EMERGENCY MEDICINE

## 2023-10-03 PROCEDURE — 96361 HYDRATE IV INFUSION ADD-ON: CPT

## 2023-10-03 PROCEDURE — 74177 CT ABD & PELVIS W/CONTRAST: CPT

## 2023-10-03 PROCEDURE — 85025 COMPLETE CBC W/AUTO DIFF WBC: CPT | Performed by: EMERGENCY MEDICINE

## 2023-10-03 PROCEDURE — 0T778DZ DILATION OF LEFT URETER WITH INTRALUMINAL DEVICE, VIA NATURAL OR ARTIFICIAL OPENING ENDOSCOPIC: ICD-10-PCS | Performed by: UROLOGY

## 2023-10-03 PROCEDURE — 85025 COMPLETE CBC W/AUTO DIFF WBC: CPT

## 2023-10-03 PROCEDURE — 76000 FLUOROSCOPY <1 HR PHYS/QHP: CPT

## 2023-10-03 PROCEDURE — C2617 STENT, NON-COR, TEM W/O DEL: HCPCS | Performed by: UROLOGY

## 2023-10-03 DEVICE — URETERAL STENT
Type: IMPLANTABLE DEVICE | Site: BLADDER | Status: FUNCTIONAL
Brand: PERCUFLEX™ PLUS

## 2023-10-03 RX ORDER — FAMOTIDINE 10 MG/ML
20 INJECTION, SOLUTION INTRAVENOUS
Status: COMPLETED | OUTPATIENT
Start: 2023-10-03 | End: 2023-10-03

## 2023-10-03 RX ORDER — HYDROMORPHONE HYDROCHLORIDE 1 MG/ML
0.5 INJECTION, SOLUTION INTRAMUSCULAR; INTRAVENOUS; SUBCUTANEOUS
Status: DISCONTINUED | OUTPATIENT
Start: 2023-10-03 | End: 2023-10-03 | Stop reason: HOSPADM

## 2023-10-03 RX ORDER — BISACODYL 10 MG
10 SUPPOSITORY, RECTAL RECTAL DAILY PRN
Status: DISCONTINUED | OUTPATIENT
Start: 2023-10-03 | End: 2023-10-08 | Stop reason: HOSPADM

## 2023-10-03 RX ORDER — FENTANYL CITRATE 50 UG/ML
50 INJECTION, SOLUTION INTRAMUSCULAR; INTRAVENOUS
Status: DISCONTINUED | OUTPATIENT
Start: 2023-10-03 | End: 2023-10-03 | Stop reason: HOSPADM

## 2023-10-03 RX ORDER — POLYETHYLENE GLYCOL 3350 17 G/17G
17 POWDER, FOR SOLUTION ORAL DAILY PRN
Status: DISCONTINUED | OUTPATIENT
Start: 2023-10-03 | End: 2023-10-08 | Stop reason: HOSPADM

## 2023-10-03 RX ORDER — SODIUM CHLORIDE 0.9 % (FLUSH) 0.9 %
10 SYRINGE (ML) INJECTION AS NEEDED
Status: DISCONTINUED | OUTPATIENT
Start: 2023-10-03 | End: 2023-10-08 | Stop reason: HOSPADM

## 2023-10-03 RX ORDER — ACETAMINOPHEN 500 MG
1000 TABLET ORAL ONCE
Status: COMPLETED | OUTPATIENT
Start: 2023-10-03 | End: 2023-10-03

## 2023-10-03 RX ORDER — MIDAZOLAM HYDROCHLORIDE 1 MG/ML
1 INJECTION INTRAMUSCULAR; INTRAVENOUS
Status: DISCONTINUED | OUTPATIENT
Start: 2023-10-03 | End: 2023-10-03 | Stop reason: HOSPADM

## 2023-10-03 RX ORDER — ACETAMINOPHEN 325 MG/1
650 TABLET ORAL EVERY 6 HOURS PRN
Status: DISCONTINUED | OUTPATIENT
Start: 2023-10-03 | End: 2023-10-03

## 2023-10-03 RX ORDER — SODIUM CHLORIDE 9 MG/ML
10 INJECTION INTRAVENOUS AS NEEDED
Status: DISCONTINUED | OUTPATIENT
Start: 2023-10-03 | End: 2023-10-03 | Stop reason: HOSPADM

## 2023-10-03 RX ORDER — FENTANYL CITRATE 50 UG/ML
INJECTION, SOLUTION INTRAMUSCULAR; INTRAVENOUS AS NEEDED
Status: DISCONTINUED | OUTPATIENT
Start: 2023-10-03 | End: 2023-10-03 | Stop reason: SURG

## 2023-10-03 RX ORDER — OXYCODONE HYDROCHLORIDE AND ACETAMINOPHEN 5; 325 MG/1; MG/1
1 TABLET ORAL EVERY 6 HOURS PRN
Status: DISCONTINUED | OUTPATIENT
Start: 2023-10-03 | End: 2023-10-07

## 2023-10-03 RX ORDER — CHOLECALCIFEROL (VITAMIN D3) 125 MCG
5 CAPSULE ORAL NIGHTLY PRN
Status: DISCONTINUED | OUTPATIENT
Start: 2023-10-03 | End: 2023-10-08 | Stop reason: HOSPADM

## 2023-10-03 RX ORDER — SODIUM CHLORIDE, SODIUM LACTATE, POTASSIUM CHLORIDE, CALCIUM CHLORIDE 600; 310; 30; 20 MG/100ML; MG/100ML; MG/100ML; MG/100ML
9 INJECTION, SOLUTION INTRAVENOUS CONTINUOUS PRN
Status: DISCONTINUED | OUTPATIENT
Start: 2023-10-03 | End: 2023-10-03 | Stop reason: HOSPADM

## 2023-10-03 RX ORDER — SODIUM CHLORIDE 0.9 % (FLUSH) 0.9 %
10 SYRINGE (ML) INJECTION EVERY 12 HOURS SCHEDULED
Status: DISCONTINUED | OUTPATIENT
Start: 2023-10-04 | End: 2023-10-08 | Stop reason: HOSPADM

## 2023-10-03 RX ORDER — LIDOCAINE HYDROCHLORIDE 10 MG/ML
INJECTION, SOLUTION EPIDURAL; INFILTRATION; INTRACAUDAL; PERINEURAL AS NEEDED
Status: DISCONTINUED | OUTPATIENT
Start: 2023-10-03 | End: 2023-10-03 | Stop reason: SURG

## 2023-10-03 RX ORDER — ACETAMINOPHEN 325 MG/1
650 TABLET ORAL EVERY 4 HOURS PRN
Status: DISCONTINUED | OUTPATIENT
Start: 2023-10-03 | End: 2023-10-08 | Stop reason: HOSPADM

## 2023-10-03 RX ORDER — OXYCODONE HYDROCHLORIDE 5 MG/1
5 TABLET ORAL EVERY 6 HOURS PRN
Qty: 12 TABLET | Refills: 0 | Status: SHIPPED | OUTPATIENT
Start: 2023-10-03 | End: 2023-10-08 | Stop reason: HOSPADM

## 2023-10-03 RX ORDER — ETOMIDATE 2 MG/ML
INJECTION INTRAVENOUS AS NEEDED
Status: DISCONTINUED | OUTPATIENT
Start: 2023-10-03 | End: 2023-10-03 | Stop reason: SURG

## 2023-10-03 RX ORDER — MORPHINE SULFATE 1 MG/ML
2 INJECTION, SOLUTION EPIDURAL; INTRATHECAL; INTRAVENOUS
Status: DISCONTINUED | OUTPATIENT
Start: 2023-10-03 | End: 2023-10-03

## 2023-10-03 RX ORDER — SODIUM CHLORIDE 0.9 % (FLUSH) 0.9 %
10 SYRINGE (ML) INJECTION EVERY 12 HOURS SCHEDULED
Status: DISCONTINUED | OUTPATIENT
Start: 2023-10-03 | End: 2023-10-03 | Stop reason: HOSPADM

## 2023-10-03 RX ORDER — ONDANSETRON 2 MG/ML
4 INJECTION INTRAMUSCULAR; INTRAVENOUS ONCE
Status: COMPLETED | OUTPATIENT
Start: 2023-10-03 | End: 2023-10-03

## 2023-10-03 RX ORDER — ONDANSETRON 4 MG/1
4 TABLET, ORALLY DISINTEGRATING ORAL EVERY 6 HOURS PRN
Qty: 9 TABLET | Refills: 0 | Status: SHIPPED | OUTPATIENT
Start: 2023-10-03

## 2023-10-03 RX ORDER — SODIUM CHLORIDE 450 MG/100ML
100 INJECTION, SOLUTION INTRAVENOUS CONTINUOUS
Status: DISCONTINUED | OUTPATIENT
Start: 2023-10-03 | End: 2023-10-04

## 2023-10-03 RX ORDER — MORPHINE SULFATE 2 MG/ML
2 INJECTION, SOLUTION INTRAMUSCULAR; INTRAVENOUS
Status: DISCONTINUED | OUTPATIENT
Start: 2023-10-03 | End: 2023-10-07

## 2023-10-03 RX ORDER — PROPOFOL 10 MG/ML
VIAL (ML) INTRAVENOUS AS NEEDED
Status: DISCONTINUED | OUTPATIENT
Start: 2023-10-03 | End: 2023-10-03 | Stop reason: SURG

## 2023-10-03 RX ORDER — TAMSULOSIN HYDROCHLORIDE 0.4 MG/1
0.4 CAPSULE ORAL DAILY
Status: DISCONTINUED | OUTPATIENT
Start: 2023-10-04 | End: 2023-10-08 | Stop reason: HOSPADM

## 2023-10-03 RX ORDER — MAGNESIUM HYDROXIDE 1200 MG/15ML
LIQUID ORAL AS NEEDED
Status: DISCONTINUED | OUTPATIENT
Start: 2023-10-03 | End: 2023-10-03 | Stop reason: HOSPADM

## 2023-10-03 RX ORDER — ONDANSETRON 2 MG/ML
4 INJECTION INTRAMUSCULAR; INTRAVENOUS EVERY 6 HOURS PRN
Status: DISCONTINUED | OUTPATIENT
Start: 2023-10-03 | End: 2023-10-08 | Stop reason: HOSPADM

## 2023-10-03 RX ORDER — DEXAMETHASONE SODIUM PHOSPHATE 4 MG/ML
INJECTION, SOLUTION INTRA-ARTICULAR; INTRALESIONAL; INTRAMUSCULAR; INTRAVENOUS; SOFT TISSUE AS NEEDED
Status: DISCONTINUED | OUTPATIENT
Start: 2023-10-03 | End: 2023-10-03 | Stop reason: SURG

## 2023-10-03 RX ORDER — SODIUM CHLORIDE 9 MG/ML
40 INJECTION, SOLUTION INTRAVENOUS AS NEEDED
Status: DISCONTINUED | OUTPATIENT
Start: 2023-10-03 | End: 2023-10-08 | Stop reason: HOSPADM

## 2023-10-03 RX ORDER — OXYCODONE HYDROCHLORIDE 5 MG/1
5 TABLET ORAL ONCE
Status: COMPLETED | OUTPATIENT
Start: 2023-10-03 | End: 2023-10-03

## 2023-10-03 RX ORDER — SUCCINYLCHOLINE/SOD CL,ISO/PF 200MG/10ML
SYRINGE (ML) INTRAVENOUS AS NEEDED
Status: DISCONTINUED | OUTPATIENT
Start: 2023-10-03 | End: 2023-10-03 | Stop reason: SURG

## 2023-10-03 RX ORDER — ONDANSETRON 2 MG/ML
INJECTION INTRAMUSCULAR; INTRAVENOUS AS NEEDED
Status: DISCONTINUED | OUTPATIENT
Start: 2023-10-03 | End: 2023-10-03 | Stop reason: SURG

## 2023-10-03 RX ORDER — BISACODYL 5 MG/1
5 TABLET, DELAYED RELEASE ORAL DAILY PRN
Status: DISCONTINUED | OUTPATIENT
Start: 2023-10-03 | End: 2023-10-08 | Stop reason: HOSPADM

## 2023-10-03 RX ORDER — SODIUM CHLORIDE 0.9 % (FLUSH) 0.9 %
10 SYRINGE (ML) INJECTION AS NEEDED
Status: DISCONTINUED | OUTPATIENT
Start: 2023-10-03 | End: 2023-10-03 | Stop reason: HOSPADM

## 2023-10-03 RX ORDER — TAMSULOSIN HYDROCHLORIDE 0.4 MG/1
1 CAPSULE ORAL DAILY
Qty: 30 CAPSULE | Refills: 0 | Status: SHIPPED | OUTPATIENT
Start: 2023-10-03 | End: 2023-10-08 | Stop reason: HOSPADM

## 2023-10-03 RX ORDER — AMOXICILLIN 250 MG
2 CAPSULE ORAL 2 TIMES DAILY
Status: DISCONTINUED | OUTPATIENT
Start: 2023-10-04 | End: 2023-10-08 | Stop reason: HOSPADM

## 2023-10-03 RX ORDER — ONDANSETRON 2 MG/ML
4 INJECTION INTRAMUSCULAR; INTRAVENOUS EVERY 6 HOURS PRN
Status: DISCONTINUED | OUTPATIENT
Start: 2023-10-03 | End: 2023-10-03

## 2023-10-03 RX ORDER — ROCURONIUM BROMIDE 10 MG/ML
INJECTION, SOLUTION INTRAVENOUS AS NEEDED
Status: DISCONTINUED | OUTPATIENT
Start: 2023-10-03 | End: 2023-10-03 | Stop reason: SURG

## 2023-10-03 RX ORDER — SODIUM CHLORIDE 9 MG/ML
40 INJECTION, SOLUTION INTRAVENOUS AS NEEDED
Status: DISCONTINUED | OUTPATIENT
Start: 2023-10-03 | End: 2023-10-03 | Stop reason: HOSPADM

## 2023-10-03 RX ADMIN — ROCURONIUM BROMIDE 5 MG: 10 SOLUTION INTRAVENOUS at 22:16

## 2023-10-03 RX ADMIN — ACETAMINOPHEN 650 MG: 325 TABLET ORAL at 23:54

## 2023-10-03 RX ADMIN — Medication 160 MG: at 22:16

## 2023-10-03 RX ADMIN — ONDANSETRON 4 MG: 2 INJECTION INTRAMUSCULAR; INTRAVENOUS at 22:29

## 2023-10-03 RX ADMIN — IOPAMIDOL 100 ML: 612 INJECTION, SOLUTION INTRAVENOUS at 03:29

## 2023-10-03 RX ADMIN — SODIUM CHLORIDE, POTASSIUM CHLORIDE, SODIUM LACTATE AND CALCIUM CHLORIDE 1986 ML: 600; 310; 30; 20 INJECTION, SOLUTION INTRAVENOUS at 04:13

## 2023-10-03 RX ADMIN — DEXAMETHASONE SODIUM PHOSPHATE 4 MG: 4 INJECTION, SOLUTION INTRAMUSCULAR; INTRAVENOUS at 22:20

## 2023-10-03 RX ADMIN — ONDANSETRON 4 MG: 2 INJECTION INTRAMUSCULAR; INTRAVENOUS at 04:14

## 2023-10-03 RX ADMIN — ETOMIDATE 16 MG: 2 INJECTION INTRAVENOUS at 22:16

## 2023-10-03 RX ADMIN — FAMOTIDINE 20 MG: 10 INJECTION INTRAVENOUS at 22:06

## 2023-10-03 RX ADMIN — ACETAMINOPHEN 1000 MG: 500 TABLET ORAL at 04:21

## 2023-10-03 RX ADMIN — SODIUM CHLORIDE, POTASSIUM CHLORIDE, SODIUM LACTATE AND CALCIUM CHLORIDE 9 ML/HR: 600; 310; 30; 20 INJECTION, SOLUTION INTRAVENOUS at 22:07

## 2023-10-03 RX ADMIN — SODIUM CHLORIDE 1000 ML: 9 INJECTION, SOLUTION INTRAVENOUS at 21:23

## 2023-10-03 RX ADMIN — FENTANYL CITRATE 100 MCG: 50 INJECTION, SOLUTION INTRAMUSCULAR; INTRAVENOUS at 22:16

## 2023-10-03 RX ADMIN — OXYCODONE HYDROCHLORIDE 5 MG: 5 TABLET ORAL at 04:20

## 2023-10-03 RX ADMIN — LIDOCAINE HYDROCHLORIDE 50 MG: 10 INJECTION, SOLUTION EPIDURAL; INFILTRATION; INTRACAUDAL; PERINEURAL at 22:16

## 2023-10-03 RX ADMIN — PROPOFOL 50 MG: 10 INJECTION, EMULSION INTRAVENOUS at 22:16

## 2023-10-03 NOTE — DISCHARGE INSTRUCTIONS
Today you were diagnosed with a kidney stone in the emergency department.  It is likely that the stone will pass through your urine stream.  Until it does you are likely to have episodic pain, nausea.   I recommend you take Tylenol 650 mg every 6 hours and ibuprofen 400 mg every 6 hours as needed for pain unless you have a contraindication to these medications  Take prescribed oxycodone as needed for severe episodes of pain.  Take prescribed ondansetron as needed for nausea or vomiting.  Take prescribed tamsulosin to help stone pass.  Call to schedule followup appointment with a Urologist for 5 to 7 days from now.  Return to the ER as needed for new or worsening symptoms, especially if you develop signs of an infection.

## 2023-10-03 NOTE — TELEPHONE ENCOUNTER
Spoke with the patient advised her of her positive blood culture findings.  She tells me she was still hurting in her flank.  I did recommend that she return to the ER for further evaluation and repeat lab for evaluation of pyelonephritis bacteremia etc.  She verbalized understanding she plans on returning tonight.

## 2023-10-03 NOTE — ED PROVIDER NOTES
[facility]    EMERGENCY DEPARTMENT ENCOUNTER      Pt Name: Salma Strickland  MRN: [id number]  YOB: 1980  Date of evaluation: 10/3/2023  Provider: Arpit Jackson MD    CHIEF COMPLAINT       Chief Complaint   Patient presents with    Positive Blood Cultures         HISTORY OF PRESENT ILLNESS   Salma Strickland is a 43 y.o. female who presents to the emergency department with report of positive blood cultures.  Patient was evaluated here overnight last night for flank pain and was found to have ureteral stone and pyelonephritis.  She was discharged home for urology follow-up with oral antibiotics.  She tells me she has continued to have some pain throughout the day but has not had any vomiting.  She has developed some fever and chills.  She reports no prior history of kidney stone.      Nursing notes were reviewed.    REVIEW OF SYSTEMS     ROS:  A chief complaint appropriate review of systems was completed and is negative except as noted in the HPI.      PAST MEDICAL HISTORY   No past medical history on file.      SURGICAL HISTORY     No past surgical history on file.      CURRENT MEDICATIONS       Current Facility-Administered Medications:     cefTRIAXone (ROCEPHIN) 2000 mg/100 mL 0.9% NS IVPB (MBP), 2,000 mg, Intravenous, Once, Arpit Jackson MD    sodium chloride 0.9 % bolus 1,000 mL, 1,000 mL, Intravenous, Once, Arpit Jackson MD    sodium chloride 0.9 % flush 10 mL, 10 mL, Intravenous, PRN, Arpit Jackson MD    Current Outpatient Medications:     ondansetron ODT (ZOFRAN-ODT) 4 MG disintegrating tablet, Place 1 tablet on the tongue Every 6 (Six) Hours As Needed for Nausea or Vomiting., Disp: 9 tablet, Rfl: 0    oxyCODONE (Roxicodone) 5 MG immediate release tablet, Take 1 tablet by mouth Every 6 (Six) Hours As Needed for Moderate Pain for up to 3 days., Disp: 12 tablet, Rfl: 0    tamsulosin (FLOMAX) 0.4 MG capsule 24 hr capsule, Take 1 capsule by mouth Daily., Disp: 30 capsule, Rfl:  "0    ALLERGIES     No known drug allergy    FAMILY HISTORY     No family history on file.       SOCIAL HISTORY       Social History     Socioeconomic History    Marital status: Single   Tobacco Use    Smoking status: Never    Smokeless tobacco: Never   Substance and Sexual Activity    Alcohol use: No    Drug use: No    Sexual activity: Defer         PHYSICAL EXAM    (up to 7 for level 4, 8 or more for level 5)     Vitals:    10/03/23 1943   BP: 109/65   Pulse: 118   Resp: 20   Temp: (!) 103.1 °F (39.5 °C)   TempSrc: Oral   SpO2: 93%   Weight: 102 kg (225 lb)   Height: 175.3 cm (69\")       General: Awake, alert, no acute distress.  HEENT: Conjunctivae normal.  Neck: Trachea midline.  Cardiac: Tachycardic, regular rhythm, no murmurs, rubs, or gallops  Lungs: Lungs are clear to auscultation, there is no wheezing, rhonchi, or rales. There is no use of accessory muscles.  Chest wall: There is no tenderness to palpation over the chest wall or over ribs  Abdomen: Abdomen is soft, nontender, nondistended. There are no firm or pulsatile masses, no rebound rigidity or guarding.   Musculoskeletal: No deformity.  Neuro: Alert and oriented x 4.  Dermatology: Skin is warm and dry  Psych: Mentation is grossly normal, cognition is grossly normal. Affect is appropriate.        DIAGNOSTIC RESULTS     EKG: All EKGs are interpreted by the Emergency Department Physician who either signs or Co-signs this chart in the absence of a cardiologist.    No orders to display         RADIOLOGY:   [x] Radiologist's Report Reviewed:  No orders to display       I ordered and independently reviewed the above noted radiographic studies.        LABS:    I have reviewed and interpreted all of the currently available lab results from this visit (if applicable):  Results for orders placed or performed during the hospital encounter of 10/03/23   Blood Culture - Blood, Hand, Left    Specimen: Hand, Left; Blood   Result Value Ref Range    Blood Culture " Abnormal Stain (C)     Gram Stain Anaerobic Bottle Gram negative bacilli (C)     Gram Stain Aerobic Bottle Gram negative bacilli (C)    Blood Culture - Blood, Arm, Left    Specimen: Arm, Left; Blood   Result Value Ref Range    Blood Culture Abnormal Stain (C)     Gram Stain Anaerobic Bottle Gram negative bacilli (C)    Blood Culture ID, PCR - Blood, Arm, Left    Specimen: Arm, Left; Blood   Result Value Ref Range    BCID, PCR Proteus spp. Identification by BCID2 PCR. (A) Negative by BCID PCR. Culture to Follow.    BCID, PCR 2 Enterobacterales spp. Identification by BCID2 PCR. (A) Negative by BCID PCR. Culture to Follow.    BOTTLE TYPE Anaerobic Bottle    Comprehensive Metabolic Panel    Specimen: Blood   Result Value Ref Range    Glucose 129 (H) 65 - 99 mg/dL    BUN 13 6 - 20 mg/dL    Creatinine 1.36 (H) 0.57 - 1.00 mg/dL    Sodium 136 136 - 145 mmol/L    Potassium 4.1 3.5 - 5.2 mmol/L    Chloride 101 98 - 107 mmol/L    CO2 25.0 22.0 - 29.0 mmol/L    Calcium 8.8 8.6 - 10.5 mg/dL    Total Protein 7.5 6.0 - 8.5 g/dL    Albumin 3.9 3.5 - 5.2 g/dL    ALT (SGPT) 9 1 - 33 U/L    AST (SGOT) 19 1 - 32 U/L    Alkaline Phosphatase 82 39 - 117 U/L    Total Bilirubin 0.5 0.0 - 1.2 mg/dL    Globulin 3.6 gm/dL    A/G Ratio 1.1 g/dL    BUN/Creatinine Ratio 9.6 7.0 - 25.0    Anion Gap 10.0 5.0 - 15.0 mmol/L    eGFR 50.0 (L) >60.0 mL/min/1.73   Lipase    Specimen: Blood   Result Value Ref Range    Lipase 31 13 - 60 U/L   Urinalysis With Microscopic If Indicated (No Culture) - Urine, Clean Catch    Specimen: Urine, Clean Catch   Result Value Ref Range    Color, UA Yellow Yellow, Straw    Appearance, UA Clear Clear    pH, UA 7.0 5.0 - 8.0    Specific Gravity, UA 1.020 1.001 - 1.030    Glucose, UA Negative Negative    Ketones, UA Negative Negative    Bilirubin, UA Negative Negative    Blood, UA Negative Negative    Protein, UA Negative Negative    Leuk Esterase, UA Negative Negative    Nitrite, UA Negative Negative    Urobilinogen, UA  1.0 E.U./dL 0.2 - 1.0 E.U./dL   CBC Auto Differential    Specimen: Blood   Result Value Ref Range    WBC 17.13 (H) 3.40 - 10.80 10*3/mm3    RBC 4.81 3.77 - 5.28 10*6/mm3    Hemoglobin 11.4 (L) 12.0 - 15.9 g/dL    Hematocrit 36.3 34.0 - 46.6 %    MCV 75.5 (L) 79.0 - 97.0 fL    MCH 23.7 (L) 26.6 - 33.0 pg    MCHC 31.4 (L) 31.5 - 35.7 g/dL    RDW 14.4 12.3 - 15.4 %    RDW-SD 38.7 37.0 - 54.0 fl    MPV 8.9 6.0 - 12.0 fL    Platelets 356 140 - 450 10*3/mm3    Neutrophil % 89.3 (H) 42.7 - 76.0 %    Lymphocyte % 4.5 (L) 19.6 - 45.3 %    Monocyte % 5.6 5.0 - 12.0 %    Eosinophil % 0.1 (L) 0.3 - 6.2 %    Basophil % 0.1 0.0 - 1.5 %    Immature Grans % 0.4 0.0 - 0.5 %    Neutrophils, Absolute 15.30 (H) 1.70 - 7.00 10*3/mm3    Lymphocytes, Absolute 0.77 0.70 - 3.10 10*3/mm3    Monocytes, Absolute 0.96 (H) 0.10 - 0.90 10*3/mm3    Eosinophils, Absolute 0.02 0.00 - 0.40 10*3/mm3    Basophils, Absolute 0.02 0.00 - 0.20 10*3/mm3    Immature Grans, Absolute 0.06 (H) 0.00 - 0.05 10*3/mm3    nRBC 0.0 0.0 - 0.2 /100 WBC   Lactic Acid, Plasma    Specimen: Blood   Result Value Ref Range    Lactate 1.8 0.5 - 2.0 mmol/L   Pregnancy, Urine - Urine, Clean Catch    Specimen: Urine, Clean Catch   Result Value Ref Range    HCG, Urine QL Negative Negative   Green Top (Gel)   Result Value Ref Range    Extra Tube Hold for add-ons.    Lavender Top   Result Value Ref Range    Extra Tube hold for add-on    Gold Top - SST   Result Value Ref Range    Extra Tube Hold for add-ons.    Gray Top   Result Value Ref Range    Extra Tube Hold for add-ons.    Light Blue Top   Result Value Ref Range    Extra Tube Hold for add-ons.         If labs were ordered, I independently reviewed the results and considered them in treating the patient.      EMERGENCY DEPARTMENT COURSE and DIFFERENTIAL DIAGNOSIS/MDM:   Vitals:  AS OF 20:58 EDT    BP - 109/65  HR - 118  TEMP - (!) 103.1 °F (39.5 °C) (Oral)  O2 SATS - 93%        Discussion below represents my analysis of  pertinent findings related to patient's condition, differential diagnosis, treatment plan and final disposition.      Differential diagnosis:  The differential diagnosis associated with the patient's presentation includes: Bacteremia, infected ureteral stone, pyelonephritis      Independent interpretations (ECG/rhythm strip/X-ray/US/CT scan): I independently interpreted the patient's abdominal CT from last night which showed evidence of obstructing ureteral stone.  I interpreted the patient's cardiac monitor here today which showed sinus tachycardia.      Additional sources:  Discussed/obtained information from independent historians:   [] Spouse:   [] Parent:   [] Friend:   [] EMS:   [] Other:  External (non-ED) record review:   [] Inpatient record:   [] Office record:   [] Outpatient record:   [x] Prior Outpatient labs: I reviewed blood culture results that were drawn overnight that showed Gram negative bacilli.   [x] Prior Outpatient radiology: I reviewed CT scan performed overnight last night which showed obstructing ureteral stone.   [] Primary Care record:   [] Outside ED record:   [] Other:       Patient's care impacted by:   [] Diabetes   [] Hypertension   [] Coronary Artery Disease   [] Cancer   [] Other:     Care significantly affected by Social Determinants of Health (housing and economic circumstances, unemployment)    [] Yes     [x] No   If yes, Patient's care significantly limited by  Social Determinants of Health including:    [] Inadequate housing    [] Low income    [] Alcoholism and drug addiction in family    [] Problems related to primary support group    [] Unemployment    [] Problems related to employment    [] Other Social Determinants of Health:       Consideration of admission/observation vs discharge: Patient with acute sepsis secondary to infected obstructing ureteral stone and requires admission for further managemen      I considered prescription management with:    [] Pain  medication:   [] Antiviral:   [x] Antibiotic: Patient started on Rocephin   [] Other:    ED Course:    ED Course as of 10/03/23 2058   Tue Oct 03, 2023   1954 I spoke w/ Dr. Schroeder with urology.  I discussed patient history, presentation, work-up.  Will consult. [NS]   2056 Patient presents with infected ureteral stone with associated sepsis.  Febrile and tachycardic on return.  White count elevated on initial evaluation last night with obstructing ureteral stone.  I consulted urology immediately who came in to see the patient and is planning to take her to the OR.  Have ordered Rocephin for her. [NS]      ED Course User Index  [NS] Arpit Jackson MD           CRITICAL CARE TIME    Approximately 35 minutes of discontinuous critical care time was provided to this patient by myself absent of any time spent performing procedures.  Patient presents critically ill with acute sepsis secondary to pyelonephritis complicated by obstructing ureteral stone placing the cardiovascular, respiratory, neurologic, renal systems at risk requiring the following interventions: IV fluid resuscitation, IV antibiotics, interpretation of labs and imaging, specialist consultation, frequent reassessment, coordination of OR transfer.  Patient at high risk of deterioration and possibly death without these interventions.      FINAL IMPRESSION      1. Acute sepsis    2. Pyelonephritis    3. Ureteral stone with hydronephrosis    4. Bacteremia          DISPOSITION/PLAN     ED Disposition       ED Disposition   Send to OR    Condition   --    Comment   --                 Comment: Please note this report has been produced using speech recognition software.      Arpit Jackson MD  Attending Emergency Physician             Arpit Jackson MD  10/03/23 2059

## 2023-10-03 NOTE — ED PROVIDER NOTES
Subjective   History of Present Illness  Patient is a 43 year old female presenting to the ED [facility] for LLQ abdominal pain radiating to her lower back x 1 day. Patient states it started yesterday evening and she has not been able to get any relief from ibuprofen and a heating pad. Patient reports she has not been able to keep any solid food down due to nausea and vomiting as well as a decreased appetite. She also said she has had headaches since the abdominal pain began. Patient reports chills and fatigue but says she has had no way to check her temperature at home. She denies any diarrhea, urinary changes or changes in her menstruation cycle.      Review of Systems    History reviewed. No pertinent past medical history.    Allergies   Allergen Reactions    No Known Drug Allergy        History reviewed. No pertinent surgical history.    History reviewed. No pertinent family history.    Social History     Socioeconomic History    Marital status: Single   Tobacco Use    Smoking status: Never    Smokeless tobacco: Never   Substance and Sexual Activity    Alcohol use: No    Drug use: No    Sexual activity: Defer           Objective   Physical Exam  Constitutional:       General: She is not in acute distress.  HENT:      Head: Normocephalic and atraumatic.   Eyes:      Conjunctiva/sclera: Conjunctivae normal.      Pupils: Pupils are equal, round, and reactive to light.   Cardiovascular:      Rate and Rhythm: Regular rhythm. Tachycardia present.      Pulses: Normal pulses.      Heart sounds: No murmur heard.    No gallop.   Pulmonary:      Effort: Pulmonary effort is normal. No respiratory distress.      Comments: Tenderness localized to LLQ.  Abdominal:      General: Abdomen is flat. Bowel sounds are normal. There is no distension.      Tenderness: There is abdominal tenderness in the left lower quadrant.   Musculoskeletal:         General: No swelling or deformity. Normal range of motion.   Skin:     General: Skin is  warm and dry.      Capillary Refill: Capillary refill takes less than 2 seconds.   Neurological:      General: No focal deficit present.      Mental Status: She is alert and oriented to person, place, and time.   Psychiatric:         Mood and Affect: Mood normal.         Behavior: Behavior normal.       Procedures           ED Course                                           Medical Decision Making  Differential diagnosis includes reticulitis, kidney stone, urinary tract infection, colitis.  Lab and imaging studies were conducted.    On reassessment after therapeutic interventions patient is feeling significantly improved.    Given her leukocytosis, some evidence of infectious pathology on CT scan despite a normal urinary study I did consult with on-call urologist Dr. Jackson to discuss these findings, list appropriate management protocols.  He recommends typical kidney stone management, patient is appropriate for outpatient management, he recommends against antibiotic administration.    Patient was counseled on pain medication use, return precautions to the ER, follow-up plan with urology.  She was discharged in good condition    Problems Addressed:  Left ureteral stone: complicated acute illness or injury    Amount and/or Complexity of Data Reviewed  Labs: ordered.     Details: Laboratory work-up independently interpreted by myself is notable for leukocytosis.  No evidence of urinary tract infection, no other significant abnormalities  Radiology: ordered and independent interpretation performed.     Details: CT scan of the abdomen pelvis was independently interpreted by myself and demonstrates a left-sided ureteral stone, approximately 3 mm, there is also perinephric stranding which may be indicative of an infection.  Discussion of management or test interpretation with external provider(s): Discussion with urologist about management plans noted above    Risk  OTC drugs.  Prescription drug management.  Parenteral  controlled substances.  Decision regarding hospitalization.        Final diagnoses:   Left ureteral stone       ED Disposition  ED Disposition       ED Disposition   Discharge    Condition   Stable    Comment   --             Recent Results (from the past 24 hour(s))   Urinalysis With Microscopic If Indicated (No Culture) - Urine, Clean Catch    Collection Time: 10/03/23  2:19 AM    Specimen: Urine, Clean Catch   Result Value Ref Range    Color, UA Yellow Yellow, Straw    Appearance, UA Clear Clear    pH, UA 7.0 5.0 - 8.0    Specific Gravity, UA 1.020 1.001 - 1.030    Glucose, UA Negative Negative    Ketones, UA Negative Negative    Bilirubin, UA Negative Negative    Blood, UA Negative Negative    Protein, UA Negative Negative    Leuk Esterase, UA Negative Negative    Nitrite, UA Negative Negative    Urobilinogen, UA 1.0 E.U./dL 0.2 - 1.0 E.U./dL   Pregnancy, Urine - Urine, Clean Catch    Collection Time: 10/03/23  2:19 AM    Specimen: Urine, Clean Catch   Result Value Ref Range    HCG, Urine QL Negative Negative   Lavender Top    Collection Time: 10/03/23  2:28 AM   Result Value Ref Range    Extra Tube hold for add-on    Gold Top - SST    Collection Time: 10/03/23  2:28 AM   Result Value Ref Range    Extra Tube Hold for add-ons.    Light Blue Top    Collection Time: 10/03/23  2:28 AM   Result Value Ref Range    Extra Tube Hold for add-ons.    CBC Auto Differential    Collection Time: 10/03/23  2:28 AM    Specimen: Blood   Result Value Ref Range    WBC 17.13 (H) 3.40 - 10.80 10*3/mm3    RBC 4.81 3.77 - 5.28 10*6/mm3    Hemoglobin 11.4 (L) 12.0 - 15.9 g/dL    Hematocrit 36.3 34.0 - 46.6 %    MCV 75.5 (L) 79.0 - 97.0 fL    MCH 23.7 (L) 26.6 - 33.0 pg    MCHC 31.4 (L) 31.5 - 35.7 g/dL    RDW 14.4 12.3 - 15.4 %    RDW-SD 38.7 37.0 - 54.0 fl    MPV 8.9 6.0 - 12.0 fL    Platelets 356 140 - 450 10*3/mm3    Neutrophil % 89.3 (H) 42.7 - 76.0 %    Lymphocyte % 4.5 (L) 19.6 - 45.3 %    Monocyte % 5.6 5.0 - 12.0 %    Eosinophil  % 0.1 (L) 0.3 - 6.2 %    Basophil % 0.1 0.0 - 1.5 %    Immature Grans % 0.4 0.0 - 0.5 %    Neutrophils, Absolute 15.30 (H) 1.70 - 7.00 10*3/mm3    Lymphocytes, Absolute 0.77 0.70 - 3.10 10*3/mm3    Monocytes, Absolute 0.96 (H) 0.10 - 0.90 10*3/mm3    Eosinophils, Absolute 0.02 0.00 - 0.40 10*3/mm3    Basophils, Absolute 0.02 0.00 - 0.20 10*3/mm3    Immature Grans, Absolute 0.06 (H) 0.00 - 0.05 10*3/mm3    nRBC 0.0 0.0 - 0.2 /100 WBC   Lactic Acid, Plasma    Collection Time: 10/03/23  2:28 AM    Specimen: Blood   Result Value Ref Range    Lactate 1.8 0.5 - 2.0 mmol/L   Comprehensive Metabolic Panel    Collection Time: 10/03/23  4:20 AM    Specimen: Blood   Result Value Ref Range    Glucose 129 (H) 65 - 99 mg/dL    BUN 13 6 - 20 mg/dL    Creatinine 1.36 (H) 0.57 - 1.00 mg/dL    Sodium 136 136 - 145 mmol/L    Potassium 4.1 3.5 - 5.2 mmol/L    Chloride 101 98 - 107 mmol/L    CO2 25.0 22.0 - 29.0 mmol/L    Calcium 8.8 8.6 - 10.5 mg/dL    Total Protein 7.5 6.0 - 8.5 g/dL    Albumin 3.9 3.5 - 5.2 g/dL    ALT (SGPT) 9 1 - 33 U/L    AST (SGOT) 19 1 - 32 U/L    Alkaline Phosphatase 82 39 - 117 U/L    Total Bilirubin 0.5 0.0 - 1.2 mg/dL    Globulin 3.6 gm/dL    A/G Ratio 1.1 g/dL    BUN/Creatinine Ratio 9.6 7.0 - 25.0    Anion Gap 10.0 5.0 - 15.0 mmol/L    eGFR 50.0 (L) >60.0 mL/min/1.73   Lipase    Collection Time: 10/03/23  4:20 AM    Specimen: Blood   Result Value Ref Range    Lipase 31 13 - 60 U/L     Note: In addition to lab results from this visit, the labs listed above may include labs taken at another facility or during a different encounter within the last 24 hours. Please correlate lab times with ED admission and discharge times for further clarification of the services performed during this visit.    CT Abdomen Pelvis With Contrast   Final Result   Impression:   3 mm proximal left ureteral stone with delayed left nephrogram and perinephric stranding. The findings are suggestive of left-sided pyelonephritis secondary  "to an obstructing proximal left ureteral stone.            Electronically Signed: Zen Vale MD     10/3/2023 3:52 AM EDT     Workstation ID: [id number]        Vitals:    10/03/23 0213   BP: 120/85   BP Location: Left arm   Patient Position: Sitting   Pulse: 106   Resp: 18   Temp: 98.7 °F (37.1 °C)   SpO2: 97%   Weight: 102 kg (225 lb)   Height: 175.3 cm (69\")     Medications   Sodium Chloride (PF) 0.9 % 10 mL (has no administration in time range)   lactated ringers bolus 1,986 mL (1,986 mL Intravenous New Bag 10/3/23 0413)   acetaminophen (TYLENOL) tablet 1,000 mg (1,000 mg Oral Given 10/3/23 0421)   oxyCODONE (ROXICODONE) immediate release tablet 5 mg (5 mg Oral Given 10/3/23 0420)   ondansetron (ZOFRAN) injection 4 mg (4 mg Intravenous Given 10/3/23 0414)   iopamidol (ISOVUE-300) 61 % injection 100 mL (100 mL Intravenous Given 10/3/23 0329)     ECG/EMG Results (last 24 hours)       ** No results found for the last 24 hours. **          No orders to display         Jimy Jackson MD  [address] [address]  [phone]    Call in 1 day           Medication List        New Prescriptions      ondansetron ODT 4 MG disintegrating tablet  Commonly known as: ZOFRAN-ODT  Place 1 tablet on the tongue Every 6 (Six) Hours As Needed for Nausea or Vomiting.     oxyCODONE 5 MG immediate release tablet  Commonly known as: Roxicodone  Take 1 tablet by mouth Every 6 (Six) Hours As Needed for Moderate Pain for up to 3 days.     tamsulosin 0.4 MG capsule 24 hr capsule  Commonly known as: FLOMAX  Take 1 capsule by mouth Daily.               Where to Get Your Medications        These medications were sent to [facility] [address] [place] [address] - [facility] - [phone]  - [phone]   [address] [id number]      Phone: [phone]   ondansetron ODT 4 MG disintegrating tablet  oxyCODONE 5 MG immediate release tablet  tamsulosin 0.4 MG capsule 24 hr capsule          "   Maxi Carlson MD  10/03/23 0570

## 2023-10-03 NOTE — Clinical Note
Level of Care: Telemetry [5]   Diagnosis: Nephrolithiasis [199102]   Admitting Physician: MARCIE AHMADI III [[id number]]   Attending Physician: MARCIE AHMADI III [[id number]]   Bed Request Comments: tele inpt

## 2023-10-04 PROBLEM — A41.9 SEPSIS: Status: ACTIVE | Noted: 2023-10-04

## 2023-10-04 PROBLEM — N12 PYELONEPHRITIS: Status: ACTIVE | Noted: 2023-10-04

## 2023-10-04 PROBLEM — N17.9 AKI (ACUTE KIDNEY INJURY): Status: ACTIVE | Noted: 2023-10-04

## 2023-10-04 PROBLEM — R78.81 BACTEREMIA: Status: ACTIVE | Noted: 2023-10-04

## 2023-10-04 PROBLEM — D64.9 ANEMIA: Status: ACTIVE | Noted: 2023-10-04

## 2023-10-04 LAB
ANION GAP SERPL CALCULATED.3IONS-SCNC: 14 MMOL/L (ref 5–15)
BACTERIA BLD CULT: ABNORMAL
BOTTLE TYPE: ABNORMAL
BUN SERPL-MCNC: 17 MG/DL (ref 6–20)
BUN/CREAT SERPL: 11.4 (ref 7–25)
CALCIUM SPEC-SCNC: 8.1 MG/DL (ref 8.6–10.5)
CHLORIDE SERPL-SCNC: 102 MMOL/L (ref 98–107)
CO2 SERPL-SCNC: 20 MMOL/L (ref 22–29)
CREAT SERPL-MCNC: 1.49 MG/DL (ref 0.57–1)
DEPRECATED RDW RBC AUTO: 38.7 FL (ref 37–54)
EGFRCR SERPLBLD CKD-EPI 2021: 44.8 ML/MIN/1.73
ERYTHROCYTE [DISTWIDTH] IN BLOOD BY AUTOMATED COUNT: 14.5 % (ref 12.3–15.4)
FERRITIN SERPL-MCNC: 211.9 NG/ML (ref 13–150)
FOLATE SERPL-MCNC: 6.2 NG/ML (ref 4.78–24.2)
GLUCOSE SERPL-MCNC: 130 MG/DL (ref 65–99)
HBA1C MFR BLD: 6.6 % (ref 4.8–5.6)
HCT VFR BLD AUTO: 33.6 % (ref 34–46.6)
HGB BLD-MCNC: 10.6 G/DL (ref 12–15.9)
HOLD SPECIMEN: NORMAL
IRON 24H UR-MRATE: 8 MCG/DL (ref 37–145)
IRON SATN MFR SERPL: 3 % (ref 20–50)
MCH RBC QN AUTO: 23.5 PG (ref 26.6–33)
MCHC RBC AUTO-ENTMCNC: 31.5 G/DL (ref 31.5–35.7)
MCV RBC AUTO: 74.3 FL (ref 79–97)
PLATELET # BLD AUTO: 297 10*3/MM3 (ref 140–450)
PMV BLD AUTO: 10.1 FL (ref 6–12)
POTASSIUM SERPL-SCNC: 3.8 MMOL/L (ref 3.5–5.2)
RBC # BLD AUTO: 4.52 10*6/MM3 (ref 3.77–5.28)
SODIUM SERPL-SCNC: 136 MMOL/L (ref 136–145)
TIBC SERPL-MCNC: 283 MCG/DL (ref 298–536)
TRANSFERRIN SERPL-MCNC: 190 MG/DL (ref 200–360)
VIT B12 BLD-MCNC: 474 PG/ML (ref 211–946)
WBC NRBC COR # BLD: 15.06 10*3/MM3 (ref 3.4–10.8)

## 2023-10-04 PROCEDURE — 25810000003 LACTATED RINGERS PER 1000 ML: Performed by: PHYSICIAN ASSISTANT

## 2023-10-04 PROCEDURE — 85027 COMPLETE CBC AUTOMATED: CPT | Performed by: UROLOGY

## 2023-10-04 PROCEDURE — 25810000003 SODIUM CHLORIDE 0.9 % SOLUTION

## 2023-10-04 PROCEDURE — 99232 SBSQ HOSP IP/OBS MODERATE 35: CPT | Performed by: INTERNAL MEDICINE

## 2023-10-04 PROCEDURE — 80048 BASIC METABOLIC PNL TOTAL CA: CPT | Performed by: UROLOGY

## 2023-10-04 PROCEDURE — 25010000002 VANCOMYCIN 10 G RECONSTITUTED SOLUTION

## 2023-10-04 PROCEDURE — 25010000002 CEFTRIAXONE PER 250 MG: Performed by: INTERNAL MEDICINE

## 2023-10-04 PROCEDURE — 82728 ASSAY OF FERRITIN: CPT | Performed by: PHYSICIAN ASSISTANT

## 2023-10-04 PROCEDURE — 84466 ASSAY OF TRANSFERRIN: CPT | Performed by: PHYSICIAN ASSISTANT

## 2023-10-04 PROCEDURE — 25010000002 PIPERACILLIN SOD-TAZOBACTAM PER 1 G: Performed by: PHYSICIAN ASSISTANT

## 2023-10-04 PROCEDURE — 83540 ASSAY OF IRON: CPT | Performed by: PHYSICIAN ASSISTANT

## 2023-10-04 RX ORDER — VANCOMYCIN/0.9 % SOD CHLORIDE 1.5G/250ML
15 PLASTIC BAG, INJECTION (ML) INTRAVENOUS 2 TIMES DAILY
Status: DISCONTINUED | OUTPATIENT
Start: 2023-10-04 | End: 2023-10-05

## 2023-10-04 RX ORDER — L.ACID,PARA/B.BIFIDUM/S.THERM 8B CELL
1 CAPSULE ORAL DAILY
Status: DISCONTINUED | OUTPATIENT
Start: 2023-10-04 | End: 2023-10-08 | Stop reason: HOSPADM

## 2023-10-04 RX ORDER — SODIUM CHLORIDE, SODIUM LACTATE, POTASSIUM CHLORIDE, CALCIUM CHLORIDE 600; 310; 30; 20 MG/100ML; MG/100ML; MG/100ML; MG/100ML
100 INJECTION, SOLUTION INTRAVENOUS CONTINUOUS
Status: ACTIVE | OUTPATIENT
Start: 2023-10-04 | End: 2023-10-06

## 2023-10-04 RX ADMIN — CEFTRIAXONE 2000 MG: 2 INJECTION, POWDER, FOR SOLUTION INTRAMUSCULAR; INTRAVENOUS at 20:06

## 2023-10-04 RX ADMIN — PIPERACILLIN SODIUM AND TAZOBACTAM SODIUM 3.38 G: 3; .375 INJECTION, SOLUTION INTRAVENOUS at 02:20

## 2023-10-04 RX ADMIN — ACETAMINOPHEN 650 MG: 325 TABLET ORAL at 20:05

## 2023-10-04 RX ADMIN — OXYCODONE HYDROCHLORIDE AND ACETAMINOPHEN 1 TABLET: 5; 325 TABLET ORAL at 08:37

## 2023-10-04 RX ADMIN — TAMSULOSIN HYDROCHLORIDE 0.4 MG: 0.4 CAPSULE ORAL at 08:37

## 2023-10-04 RX ADMIN — SODIUM CHLORIDE, POTASSIUM CHLORIDE, SODIUM LACTATE AND CALCIUM CHLORIDE 100 ML/HR: 600; 310; 30; 20 INJECTION, SOLUTION INTRAVENOUS at 02:19

## 2023-10-04 RX ADMIN — VANCOMYCIN HYDROCHLORIDE 1750 MG: 10 INJECTION, POWDER, LYOPHILIZED, FOR SOLUTION INTRAVENOUS at 03:02

## 2023-10-04 RX ADMIN — Medication 10 ML: at 20:06

## 2023-10-04 RX ADMIN — VANCOMYCIN HYDROCHLORIDE 1500 MG: 10 INJECTION, POWDER, LYOPHILIZED, FOR SOLUTION INTRAVENOUS at 14:43

## 2023-10-04 RX ADMIN — STANDARDIZED SENNA CONCENTRATE AND DOCUSATE SODIUM 2 TABLET: 8.6; 5 TABLET, FILM COATED ORAL at 08:55

## 2023-10-04 RX ADMIN — Medication 1 CAPSULE: at 08:38

## 2023-10-04 RX ADMIN — OXYCODONE HYDROCHLORIDE AND ACETAMINOPHEN 1 TABLET: 5; 325 TABLET ORAL at 14:43

## 2023-10-04 RX ADMIN — STANDARDIZED SENNA CONCENTRATE AND DOCUSATE SODIUM 2 TABLET: 8.6; 5 TABLET, FILM COATED ORAL at 20:05

## 2023-10-04 RX ADMIN — Medication 10 ML: at 00:57

## 2023-10-04 RX ADMIN — STANDARDIZED SENNA CONCENTRATE AND DOCUSATE SODIUM 2 TABLET: 8.6; 5 TABLET, FILM COATED ORAL at 00:57

## 2023-10-04 RX ADMIN — ACETAMINOPHEN 650 MG: 325 TABLET ORAL at 06:11

## 2023-10-04 RX ADMIN — PIPERACILLIN SODIUM AND TAZOBACTAM SODIUM 3.38 G: 3; .375 INJECTION, SOLUTION INTRAVENOUS at 17:27

## 2023-10-04 RX ADMIN — PIPERACILLIN SODIUM AND TAZOBACTAM SODIUM 3.38 G: 3; .375 INJECTION, SOLUTION INTRAVENOUS at 08:38

## 2023-10-04 RX ADMIN — OXYCODONE HYDROCHLORIDE AND ACETAMINOPHEN 1 TABLET: 5; 325 TABLET ORAL at 20:05

## 2023-10-04 RX ADMIN — SODIUM CHLORIDE 100 ML/HR: 4.5 INJECTION, SOLUTION INTRAVENOUS at 00:00

## 2023-10-04 RX ADMIN — SODIUM CHLORIDE, POTASSIUM CHLORIDE, SODIUM LACTATE AND CALCIUM CHLORIDE 100 ML/HR: 600; 310; 30; 20 INJECTION, SOLUTION INTRAVENOUS at 14:43

## 2023-10-04 RX ADMIN — ACETAMINOPHEN 650 MG: 325 TABLET ORAL at 14:43

## 2023-10-04 NOTE — PROGRESS NOTES
"Urology    Patient Name: Salma Strickland  Medical Record Number: [id number]  YOB: 1980     LOS: 1 day   Patient Care Team:  Job Gamez MD as PCP - General (Internal Medicine)    Chief Complaint:    Chief Complaint   Patient presents with    Positive Blood Cultures       Subjective     Interval History:     She feels better overall.  Has a headache and urinary frequency.  Spiking fevers but trending down.  Tachycardia improved.  Good urine output.    Review of Systems:    The following systems were reviewed and negative;  respiratory, cardiovascular, and gastrointestinal    Objective     Vital Signs  /64 (BP Location: Right arm, Patient Position: Lying)   Pulse 103   Temp 100.3 °F (37.9 °C) (Oral)   Resp 17   Ht 175.3 cm (69\")   Wt 92.9 kg (204 lb 14.4 oz)   LMP 09/28/2023 (Approximate)   SpO2 97%   BMI 30.26 kg/m²   I/O last 3 completed shifts:  In: 504 [I.V.:504]  Out: 1100 [Urine:1100]  I/O this shift:  In: 823 [I.V.:773; IV Piggyback:50]  Out: 350 [Urine:350]      Physical Exam:  General Appearance: No acute distress, lying in bed, pleasant, appears comfortable  Lungs: Respirations regular, even and  unlabored       Results Review:     I reviewed the patient's new clinical results.  Lab Results (last 24 hours)       Procedure Component Value Units Date/Time    Ferritin [[id number]]  (Abnormal) Collected: 10/04/23 0342    Specimen: Blood Updated: 10/04/23 0546     Ferritin 211.90 ng/mL     Narrative:      Results may be falsely decreased if patient taking Biotin.      Basic Metabolic Panel [[id number]]  (Abnormal) Collected: 10/04/23 0342    Specimen: Blood Updated: 10/04/23 0542     Glucose 130 mg/dL      BUN 17 mg/dL      Creatinine 1.49 mg/dL      Sodium 136 mmol/L      Potassium 3.8 mmol/L      Chloride 102 mmol/L      CO2 20.0 mmol/L      Calcium 8.1 mg/dL      BUN/Creatinine Ratio 11.4     Anion Gap 14.0 mmol/L      eGFR 44.8 mL/min/1.73     Narrative:      GFR Normal " Aleksandar Decker is a 89 year old male seen March 8, 2021 at [facility] unit where he has resided for >one year (admit 11/2019) seen for initial visit.   Pt is seen on the unit up to chair.  He remains ambulatory without device.  He is pleasant and conversational, states he feels well. Can be repetitive, but social graces intact    AL Nursing staff reports patient was in bed all day yesterday, for no apparent reason.   Up today.   Evidently this occurs a couple of times a week.   No behavioral symptoms reported.        By chart review, patient has had memory loss since 2009.   I saw him in my [facility] [id number]   Started on donepezil and memantine ten years ago.   Appears to tolerate them okay, and has had a remarkably gradual progression of his dementia    Past Medical History:   Diagnosis Date     Dementia of the Alzheimer's type (H)     dr Jacobo and neurologist: Kulwant     Esophageal reflux      Fever and other physiologic disturbances of temperature regulation     2005 ? tick related illness w fever;; resolved       GERD (gastroesophageal reflux disease)     better with ranitidine     Hearing loss     uses aides      Hyperlipidemia LDL goal <160      Hypothyroidism      Lumbago     chronic , non radicular     Vitamin B 12 deficiency      Past Surgical History:   Procedure Laterality Date     HC COLONOSCOPY THRU STOMA, DIAGNOSTIC      had colonoscopy in [place] 2007 (normal)      [email or web address] NONSPECIFIC PROCEDURE      appy      SH:  .   Previously lived at [facility].  He has 3 daughters: Carla, Kyra and Arelis.    He can't remember where they live.   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   Non smoker    ROS:  Ambulatory without device  MoCA 18/30  SLUMS 5/30 1/2019  Wt Readings from Last 5 Encounters:   03/08/21 75.3 kg (166 lb)   01/13/20 74.8 kg (165 lb)   11/25/19 74.8 kg (165 lb)   09/25/18 74.8 kg (165 lb)   08/01/18 75.1 kg (165 lb 9.6 oz)       EXAM:   NAD  Temp 97.8  F (36.6  C)   Wt 75.3 kg (166 lb)   SpO2 96%   BMI 26.00 kg/m     Neck supple without adenopathy  Lungs with decreased BS, no rales or wheeze  Heart RRR s1s2, 3/6 JOSH @LSB andacross precordium  Abd soft, NT, no distention, +BS  Ext without edema  Neuro: preserved language and mobility, repetitive, some disorientation.   +right hand resting tremor, no stiffness or rigidity  Psych: affect okay, cheery.     Labs reviewed  1/2021: CBC, BMP normal      IMP/PLAN:   (K21.9) Gastroesophageal reflux disease, esophagitis presence not specified   Comment: no current sx  Plan: continue omeprazole 20 mg/day and follow     (G30.1,  F02.80) Late onset Alzheimer's disease without behavioral disturbance (H)  Comment: very gradual decline over 10 years.     Low functional status and STML with preserved language and mobility   Plan: [facility] unit for support with meds, meals, activity, secure unit    Continue PTA donepezil 10 mg/day   Due to sedation and lack of ongoing effectiveness, will discontinue memantine    (M51.37) CHRONIC LOW BACK PAIN  Comment: intermittent   Plan: prn acetaminophen, local measures    (E53.8) Vitamin B 12 deficiency  Plan: continue monthly B12 1000 units SQ     Talked with daughter Arelis by phone about plan of care and medication change, and she is in agreement.     Violeta Jacobo MD    >60  Chronic Kidney Disease <60  Kidney Failure <15      Iron Profile [[id number]]  (Abnormal) Collected: 10/04/23 0342    Specimen: Blood Updated: 10/04/23 0542     Iron 8 mcg/dL      Iron Saturation (TSAT) 3 %      Transferrin 190 mg/dL      TIBC 283 mcg/dL     CBC (No Diff) [[id number]]  (Abnormal) Collected: 10/04/23 0342    Specimen: Blood Updated: 10/04/23 0516     WBC 15.06 10*3/mm3      RBC 4.52 10*6/mm3      Hemoglobin 10.6 g/dL      Hematocrit 33.6 %      MCV 74.3 fL      MCH 23.5 pg      MCHC 31.5 g/dL      RDW 14.5 %      RDW-SD 38.7 fl      MPV 10.1 fL      Platelets 297 10*3/mm3     [place] Draw [[id number]] Collected: 10/03/23 2119    Specimen: Blood Updated: 10/04/23 0130    Narrative:      The following orders were created for panel order [place] Draw.  Procedure                               Abnormality         Status                     ---------                               -----------         ------                     Green Top (Gel)[[id number]]                                                             Lavender Top[[id number]]                                     Final result               Gold Top - SST[[id number]]                                   Final result               Coats Top[[id number]]                                         Final result               Light Blue Top[[id number]]                                   Final result                 Please view results for these tests on the individual orders.    Gray Top [[id number]] Collected: 10/03/23 2119    Specimen: Blood Updated: 10/04/23 0130     Extra Tube Hold for add-ons.     Comment: Auto resulted.       Hemoglobin A1c [[id number]]  (Abnormal) Collected: 10/03/23 2119    Specimen: Blood Updated: 10/04/23 0118     Hemoglobin A1C 6.60 %     Narrative:      Hemoglobin A1C Ranges:    Increased Risk for Diabetes  5.7% to 6.4%  Diabetes                     >= 6.5%  Diabetic Goal                < 7.0%    Vitamin B12 [[id number]] Collected:  10/03/23 2119    Specimen: Blood Updated: 10/04/23 0009    Folate [[id number]] Collected: 10/03/23 2119    Specimen: Blood Updated: 10/04/23 0009    Urine Culture - Urine, Urinary Bladder [[id number]] Collected: 10/03/23 2228    Specimen: Urine from Urinary Bladder Updated: 10/03/23 2303    Gold Top - SST [[id number]] Collected: 10/03/23 2119    Specimen: Blood Updated: 10/03/23 2231     Extra Tube Hold for add-ons.     Comment: Auto resulted.       Lavender Top [[id number]] Collected: 10/03/23 2119    Specimen: Blood Updated: 10/03/23 2231     Extra Tube hold for add-on     Comment: Auto resulted       Light Blue Top [[id number]] Collected: 10/03/23 2119    Specimen: Blood Updated: 10/03/23 2231     Extra Tube Hold for add-ons.     Comment: Auto resulted       Blood Culture - Blood, Hand, Left [[id number]] Collected: 10/03/23 2138    Specimen: Blood from Hand, Left Updated: 10/03/23 2209    Blood Culture - Blood, Arm, Right [[id number]] Collected: 10/03/23 2121    Specimen: Blood from Arm, Right Updated: 10/03/23 2209    Comprehensive Metabolic Panel [[id number]]  (Abnormal) Collected: 10/03/23 2119    Specimen: Blood Updated: 10/03/23 2151     Glucose 125 mg/dL      BUN 18 mg/dL      Creatinine 1.73 mg/dL      Sodium 133 mmol/L      Potassium 3.8 mmol/L      Comment: Slight hemolysis detected by analyzer. Results may be affected.        Chloride 99 mmol/L      CO2 22.0 mmol/L      Calcium 8.6 mg/dL      Total Protein 6.7 g/dL      Albumin 3.8 g/dL      ALT (SGPT) 12 U/L      AST (SGOT) 22 U/L      Alkaline Phosphatase 76 U/L      Total Bilirubin 0.6 mg/dL      Globulin 2.9 gm/dL      Comment: Calculated Result        A/G Ratio 1.3 g/dL      BUN/Creatinine Ratio 10.4     Anion Gap 12.0 mmol/L      eGFR 37.4 mL/min/1.73     Narrative:      GFR Normal >60  Chronic Kidney Disease <60  Kidney Failure <15      Lactic Acid, Plasma [[id number]]  (Normal) Collected: 10/03/23 2119    Specimen: Blood Updated: 10/03/23 2145      Lactate 1.4 mmol/L      Comment: Falsely depressed results may occur on samples drawn from patients receiving N-Acetylcysteine (NAC) or Metamizole.       CBC & Differential [[id number]]  (Abnormal) Collected: 10/03/23 2119    Specimen: Blood Updated: 10/03/23 2135    Narrative:      The following orders were created for panel order CBC & Differential.  Procedure                               Abnormality         Status                     ---------                               -----------         ------                     CBC Auto Differential[[id number]]        Abnormal            Final result               Scan Slide[[id number]]                                                                    Please view results for these tests on the individual orders.    CBC Auto Differential [[id number]]  (Abnormal) Collected: 10/03/23 2119    Specimen: Blood Updated: 10/03/23 2135     WBC 16.24 10*3/mm3      RBC 4.57 10*6/mm3      Hemoglobin 10.7 g/dL      Hematocrit 34.1 %      MCV 74.6 fL      MCH 23.4 pg      MCHC 31.4 g/dL      RDW 14.6 %      RDW-SD 38.8 fl      MPV 9.1 fL      Platelets 287 10*3/mm3      Neutrophil % 93.5 %      Lymphocyte % 3.1 %      Monocyte % 2.8 %      Eosinophil % 0.0 %      Basophil % 0.1 %      Immature Grans % 0.5 %      Neutrophils, Absolute 15.18 10*3/mm3      Lymphocytes, Absolute 0.51 10*3/mm3      Monocytes, Absolute 0.45 10*3/mm3      Eosinophils, Absolute 0.00 10*3/mm3      Basophils, Absolute 0.02 10*3/mm3      Immature Grans, Absolute 0.08 10*3/mm3      nRBC 0.0 /100 WBC             Medication Review:    Current Facility-Administered Medications:     acetaminophen (TYLENOL) tablet 650 mg, 650 mg, Oral, Q4H PRN, Jose Moore PA-C, 650 mg at 10/04/23 0611    sennosides-docusate (PERICOLACE) 8.6-50 MG per tablet 2 tablet, 2 tablet, Oral, BID, 2 tablet at 10/04/23 0057 **AND** polyethylene glycol (MIRALAX) packet 17 g, 17 g, Oral, Daily PRN **AND** bisacodyl (DULCOLAX) EC tablet 5 mg,  5 mg, Oral, Daily PRN **AND** bisacodyl (DULCOLAX) suppository 10 mg, 10 mg, Rectal, Daily PRN, Jose Moore, PA-C    lactated ringers infusion, 100 mL/hr, Intravenous, Continuous, Jose Moore PA-C, Last Rate: 100 mL/hr at 10/04/23 0219, 100 mL/hr at 10/04/23 0219    lactobacillus acidophilus (RISAQUAD) capsule 1 capsule, 1 capsule, Oral, Daily, Jose Moore PA-C    melatonin tablet 5 mg, 5 mg, Oral, Nightly PRN, Jose Moore, PA-C    morphine injection 2 mg, 2 mg, Intravenous, Q2H PRN, Jimmy Schroeder MD    ondansetron (ZOFRAN) injection 4 mg, 4 mg, Intravenous, Q6H PRN, Jose Moore, PA-C    oxyCODONE-acetaminophen (PERCOCET) 5-325 MG per tablet 1 tablet, 1 tablet, Oral, Q6H PRN, Jimmy Schroeder MD    Pharmacy to dose vancomycin, , Does not apply, Continuous PRN, Jose Moore PA-C    piperacillin-tazobactam (ZOSYN) 3.375 g in iso-osmotic dextrose 50 ml (premix), 3.375 g, Intravenous, Q8H, Jose Moore, PA-C    sodium chloride 0.9 % flush 10 mL, 10 mL, Intravenous, PRN, Jimmy Schroeder MD    sodium chloride 0.9 % flush 10 mL, 10 mL, Intravenous, Q12H, Jose Moore, PA-C, 10 mL at 10/04/23 0057    sodium chloride 0.9 % flush 10 mL, 10 mL, Intravenous, PRN, Jose Moore, PA-C    sodium chloride 0.9 % infusion 40 mL, 40 mL, Intravenous, PRN, Jose Moore, PA-C    tamsulosin (FLOMAX) 24 hr capsule 0.4 mg, 0.4 mg, Oral, Daily, Jimmy Schroeder MD    vancomycin (dosing per levels), , Does not apply, Daily, Merle Barfield, [facility]     Assessment and Plan    43-year-old female admitted 10/3/2023 with her first stone episode, sepsis, left pyelonephritis, bacteremia, and CHATO. A CT scan showed a 3 mm left proximal ureteral stone with delayed excretion and 2 mm left lower pole stone. The initial urinalysis was negative and the lactate was normal. Blood cultures showed gram-negative rods. She underwent cystoscopy with left stent placement 10/3/2023.    She feels  better overall.  She has good urine output.  She has spiking fevers but trending down.  The WBC and creatinine improved some.  She is on Zosyn and vancomycin.    Plan: Continue broad-spectrum antibiotics pending cultures, currently Zosyn and vancomycin.  Continue tamsulosin.  Check final urine and blood cultures.  Strain urine for stone.  I will arrange left ureteroscopy and stone extraction at a later date when medically stable.      Sepsis    Nephrolithiasis    CHATO (acute kidney injury)    Anemia    Pyelonephritis    Bacteremia          Plan for disposition:Where: home and When:  TBD    Jimmy Schroeder MD  10/04/23  07:49 EDT

## 2023-10-04 NOTE — ANESTHESIA PREPROCEDURE EVALUATION
Anesthesia Evaluation     Patient summary reviewed and Nursing notes reviewed   no history of anesthetic complications:   NPO Solid Status: > 6 hours  NPO Liquid Status: > 2 hours           Airway   Mallampati: III  TM distance: >3 FB  Neck ROM: full  Possible difficult intubation  Dental - normal exam     Pulmonary - negative pulmonary ROS    breath sounds clear to auscultation  Cardiovascular - negative cardio ROS  Exercise tolerance: good (4-7 METS)    Rhythm: regular  Rate: abnormal        Neuro/Psych- negative ROS  GI/Hepatic/Renal/Endo    (+) obesity    Musculoskeletal     Abdominal   (+) obese   Substance History      OB/GYN          Other        ROS/Med Hx Other: PT IS SEPTIC                Anesthesia Plan    ASA 3 - emergent     general     intravenous induction     Anesthetic plan, risks, benefits, and alternatives have been provided, discussed and informed consent has been obtained with: patient.    CODE STATUS:

## 2023-10-04 NOTE — PLAN OF CARE
Goal Outcome Evaluation:  Plan of Care Reviewed With: patient     Problem: Adult Inpatient Plan of Care  Goal: Plan of Care Review  Outcome: Ongoing, Progressing  Flowsheets (Taken 10/4/2023 1751)  Progress: improving  Plan of Care Reviewed With: patient     Problem: Adult Inpatient Plan of Care  Goal: Absence of Hospital-Acquired Illness or Injury  Outcome: Ongoing, Progressing     Problem: Adult Inpatient Plan of Care  Goal: Optimal Comfort and Wellbeing  Outcome: Ongoing, Progressing     Problem: Pain Acute  Goal: Acceptable Pain Control and Functional Ability  Outcome: Ongoing, Progressing     Problem: Adjustment to Illness (Sepsis/Septic Shock)  Goal: Optimal Coping  Intervention: Optimize Psychosocial Adjustment to Illness  Recent Flowsheet Documentation  Taken 10/4/2023 1600 by Parvin Valderrama RN  Family/Support System Care:   involvement promoted   presence promoted  Taken 10/4/2023 1443 by Parvin Valderrama RN  Supportive Measures: active listening utilized  Family/Support System Care:   involvement promoted   presence promoted   support provided  Taken 10/4/2023 0827 by Parvin Valderrama RN  Supportive Measures: active listening utilized        Progress: improving     VSS on RA. Temp low grade at this time and pain from HA improved; states she's feeling some better. Continuing to strain urine. NSR on monitor.

## 2023-10-04 NOTE — CONSULTS
" Consult    Patient Name: Salma Strickland  Medical Record Number: [id number]  YOB: 1980    Date of consultation: 10/3/2023    Referring Provider:No ref. provider found Arpit Jackson MD  Reason for Consultation: Left ureteral stone, pyelonephritis, bacteremia, fever    Patient Care Team:  Job Gamez MD as PCP - General (Internal Medicine)    Chief complaint   Chief Complaint   Patient presents with    Positive Blood Cultures       Subjective .     History of present illness:    43-year-old black female with her first stone episode.  She had severe left renal colic and presented to the [facility] emergency room around 2:00 AM today.  A CT scan with contrast showed a 3 mm left proximal ureteral stone with delayed excretion and 2 mm left lower pole stone.  Labs included creatinine 1.36, WBC 17.13, urinalysis negative, and lactate 1.8.  She was sent home on Zofran, tamsulosin, and narcotic pain medication.  She was called back for blood cultures showing gram-negative rods.  She is tachycardic with fever.  She is being admitted by the hospitalist.  She denies previous history of UTIs.    No past medical history on file.  No past surgical history on file.  Back surgery in 1996 for scoliosis  No family history on file.  No family history of kidney stones to her knowledge.  Social History     Tobacco Use    Smoking status: Never    Smokeless tobacco: Never   Substance Use Topics    Alcohol use: No    Drug use: No     (Not in a hospital admission)    Allergies:  No known drug allergy    Review of Systems  The following systems were reviewed and negative;  respiratory and cardiovascular    Objective     Vital Signs   /65   Pulse 118   Temp (!) 103.1 °F (39.5 °C) (Oral)   Resp 20   Ht 175.3 cm (69\")   Wt 102 kg (225 lb)   LMP 09/28/2023 (Approximate)   SpO2 93%   BMI 33.23 kg/m²     Physical Exam:  General Appearance: No acute distress, sitting up in a chair, pleasant, appears " comfortable but tired  Lungs: Respirations regular, even and  unlabored  Heart: Regular rhythm and tachycardic  Extremities: Moves all extremities well, no edema, no cyanosis, no redness  Pulses: Pulses palpable  Neurologic: Neurologically grossly intact    Results Review:  Lab Results (last 24 hours)       ** No results found for the last 24 hours. **          Imaging Results (Last 72 Hours)       ** No results found for the last 72 hours. **             I reviewed the patient's new clinical results.  I reviewed the patient's new imaging results and agree with the interpretation.      Assessment and Recommendations  43-year-old female with her first stone episode.  A CT scan showed a 3 mm left proximal ureteral stone with delayed excretion and 2 mm left lower pole stone.  She had leukocytosis with WBC 17.13 and mild elevated creatinine 1.36.  The urinalysis was negative and the lactate was normal.  Blood cultures showed gram-negative rods.  She is being admitted for left pyelonephritis and further management of the stones.    I recommend urgent cystoscopy with left stent placement for obstructing stone, pyelonephritis, fever, and bacteremia.  We discussed the operative technique and postoperative expectations and care.  Potential risks include bleeding, infection, injury to the urethra, bladder, ureter, kidney, and anesthetic risks.  Her questions were answered.  She is agreeable.    Plan: Admission per hospitalist.  Start broad-spectrum antibiotic and IV fluid hydration.  To the OR urgently for cystoscopy with left stent placement.      * No active hospital problems. *      I discussed the patients findings and my recommendations with patient    Jimmy Schroeder MD  10/03/23  20:17 EDT

## 2023-10-04 NOTE — PROGRESS NOTES
[facility] Medicine Services  ADMISSION FOLLOW-UP NOTE          Patient admitted after midnight, H&P by my partner performed earlier on today's date reviewed.  Interim findings, labs, and charting also reviewed.        [facility] Problem List has been managed and updated to include any new diagnoses:  Active Hospital Problems    Diagnosis  POA    **Sepsis [A41.9]  Yes    CHATO (acute kidney injury) [N17.9]  Yes    Anemia [D64.9]  Yes    Pyelonephritis [N12]  Yes    Bacteremia [R78.81]  Yes    Nephrolithiasis [N20.0]  Yes      Resolved Hospital Problems   No resolved problems to display.         ADDITIONAL PLAN:  - detailed assessment and plan from admission reviewed    42 yo healthy female here with Sepsis, bacteremia 2/2 left pyelonephritis with obstructing left ureteral stone and hydronephrosis. S/P Fluoroscopy and cystoscopy with left ureteral stent placement per urology (Elda) today.     Sepsis Secondary to Pyelonephritis  Proteus Bacteremia POA  Left ureteral stone with hydronephrosis  - S/P Fluoroscopy and cystoscopy with left ureteral stent placement per urology (Elda) today, 10/3  - blood cultures isolated Proteus   - Repeat cultures and Ucx pending  - Continue vanc/zosyn   - Continue flomax   - ID and urology consulted - appreciate recs  - Continue IVF     CHATO  - post-renal secondary to obstruction  - Improved. Continue IVF     Anemia  - baseline hemoglobin ~ 12.8  - check iron studies    Expected Discharge   Expected Discharge Date: 10/6/2023; Expected Discharge Time:      Melody Turner DO  10/04/23

## 2023-10-04 NOTE — PLAN OF CARE
Problem: Adult Inpatient Plan of Care  Goal: Plan of Care Review  10/4/2023 0543 by Salazar Gomez RN  Outcome: Ongoing, Progressing  Flowsheets (Taken 10/4/2023 0543)  Outcome Evaluation: Patient was admitted from [facility] after cystoscopy with left stent placement. Receiving IVF and antibiotics. Patient is alert and oriented x4. C/o headache that was relieved with tylenol. T max 103 orally, treated with tylenol. Patient on room air. Sinus rhythm on monitor. Patient up voiding frequently, blood tinged urine. Urine strained but no stone noted. Up with sba to bathroom. No c/o flank pain. cultures pending.  10/4/2023 0543 by Salazar Gomez RN  Outcome: Ongoing, Progressing  Flowsheets (Taken 10/4/2023 0543)  Outcome Evaluation: Patient was admitted from [facility] after cystoscopy with left stent placement. Receiving IVF and antibiotics. Patient is alert and oriented x4. C/o headache that was relieved with tylenol. T max 103 orally, treated with tylenol. Patient on room air. Sinus rhythm on monitor. Patient up voiding frequently, blood tinged urine. Urine strained but no stone noted. Up with sba to bathroom. No c/o flank pain. cultures pending.  Goal: Patient-Specific Goal (Individualized)  10/4/2023 0543 by Salazar Gomez RN  Outcome: Ongoing, Progressing  10/4/2023 0543 by Salazar Gomez RN  Outcome: Ongoing, Progressing  Goal: Absence of Hospital-Acquired Illness or Injury  10/4/2023 0543 by Salazar Gomez RN  Outcome: Ongoing, Progressing  10/4/2023 0543 by Salazar Gomez RN  Outcome: Ongoing, Progressing  Intervention: Identify and Manage Fall Risk  Recent Flowsheet Documentation  Taken 10/4/2023 0200 by Salazar Gomez RN  Safety Promotion/Fall Prevention:   activity supervised   clutter free environment maintained   fall prevention program maintained   nonskid shoes/slippers when out of bed   safety round/check completed  Taken 10/4/2023 0000 by Salazar Gomez RN  Safety Promotion/Fall Prevention:   activity  supervised   clutter free environment maintained   fall prevention program maintained   nonskid shoes/slippers when out of bed   safety round/check completed  Intervention: Prevent Skin Injury  Recent Flowsheet Documentation  Taken 10/4/2023 0426 by Salazar Gomez RN  Skin Protection:   adhesive use limited   incontinence pads utilized  Taken 10/4/2023 0200 by Salazar Gomez RN  Body Position: position changed independently  Skin Protection:   adhesive use limited   pulse oximeter probe site changed   incontinence pads utilized  Taken 10/4/2023 0000 by Salazar Gomez RN  Body Position: position changed independently  Intervention: Prevent and Manage VTE (Venous Thromboembolism) Risk  Recent Flowsheet Documentation  Taken 10/4/2023 0200 by Salazar Gomez RN  Activity Management: activity encouraged  Taken 10/4/2023 0000 by Salazar Gomez RN  Activity Management: up ad juan  VTE Prevention/Management:   bilateral   sequential compression devices on  Intervention: Prevent Infection  Recent Flowsheet Documentation  Taken 10/4/2023 0000 by Salazar Gomez RN  Infection Prevention:   rest/sleep promoted   hand hygiene promoted  Goal: Optimal Comfort and Wellbeing  10/4/2023 0543 by Salazar Gomez RN  Outcome: Ongoing, Progressing  10/4/2023 0543 by Salazar Gomez RN  Outcome: Ongoing, Progressing  Intervention: Provide Person-Centered Care  Recent Flowsheet Documentation  Taken 10/4/2023 0000 by Salazar Gomez RN  Trust Relationship/Rapport:   care explained   questions answered   questions encouraged  Goal: Readiness for Transition of Care  10/4/2023 0543 by Salazar Gomez RN  Outcome: Ongoing, Progressing  10/4/2023 0543 by Salazar Gomez RN  Outcome: Ongoing, Progressing  Intervention: Mutually Develop Transition Plan  Recent Flowsheet Documentation  Taken 10/4/2023 0023 by Salazar Gomez RN  Transportation Anticipated: family or friend will provide  Patient/Family Anticipated Services at Transition: none  Patient/Family  Anticipates Transition to: home with family  Taken 10/4/2023 0020 by Salazar Gomez RN  Equipment Currently Used at Home: none     Problem: Pain Acute  Goal: Acceptable Pain Control and Functional Ability  10/4/2023 0543 by Salazar Gomez RN  Outcome: Ongoing, Progressing  10/4/2023 0543 by Salazar Gomez RN  Outcome: Ongoing, Progressing     Problem: Adjustment to Illness (Sepsis/Septic Shock)  Goal: Optimal Coping  10/4/2023 0543 by Salazar Gomez RN  Outcome: Ongoing, Progressing  10/4/2023 0543 by Salazar Gomez RN  Outcome: Ongoing, Progressing     Problem: Bleeding (Sepsis/Septic Shock)  Goal: Absence of Bleeding  10/4/2023 0543 by Salazar Gomez RN  Outcome: Ongoing, Progressing  10/4/2023 0543 by Salazar Gomez RN  Outcome: Ongoing, Progressing     Problem: Glycemic Control Impaired (Sepsis/Septic Shock)  Goal: Blood Glucose Level Within Desired Range  10/4/2023 0543 by Salazar Gomez RN  Outcome: Ongoing, Progressing  10/4/2023 0543 by Salazar Gomez RN  Outcome: Ongoing, Progressing     Problem: Infection Progression (Sepsis/Septic Shock)  Goal: Absence of Infection Signs and Symptoms  10/4/2023 0543 by Salazar Gomez RN  Outcome: Ongoing, Progressing  10/4/2023 0543 by Salazar Gomez RN  Outcome: Ongoing, Progressing  Intervention: Initiate Sepsis Management  Recent Flowsheet Documentation  Taken 10/4/2023 0000 by Salazar Gomez RN  Infection Prevention:   rest/sleep promoted   hand hygiene promoted  Intervention: Promote Recovery  Recent Flowsheet Documentation  Taken 10/4/2023 0200 by Salazar Gomez RN  Activity Management: activity encouraged  Taken 10/4/2023 0000 by Salazar Gomez RN  Activity Management: up ad juan     Problem: Nutrition Impaired (Sepsis/Septic Shock)  Goal: Optimal Nutrition Intake  10/4/2023 0543 by Salazar Gomez RN  Outcome: Ongoing, Progressing  10/4/2023 0543 by Salazar Gomez RN  Outcome: Ongoing, Progressing   Goal Outcome Evaluation:              Outcome Evaluation: Patient  was admitted from [facility] after cystoscopy with left stent placement. Receiving IVF and antibiotics. Patient is alert and oriented x4. C/o headache that was relieved with tylenol. T max 103 orally, treated with tylenol. Patient on room air. Sinus rhythm on monitor. Patient up voiding frequently, blood tinged urine. Urine strained but no stone noted. Up with sba to bathroom. No c/o flank pain. cultures pending.

## 2023-10-04 NOTE — CONSULTS
INFECTIOUS DISEASE CONSULT/INITIAL HOSPITAL VISIT    Salma Strickland  1980  [id number]    Date of Consult: 10/4/2023    Admission Date: 10/3/2023      Requesting Provider: No ref. provider found  Evaluating Physician: Jimy Rand MD    Reason for Consultation: bacteremia    History of present illness:    Patient is a 43 y.o. female  [facility] positive blood cultures for gram negative rods.    CT scan revealed  left ureteral stone with perinephritic stranding. Upon worsening nausea, vomiting, flank pain was admitted and taken to OR for for cystoscopy with left ureteral stent placement.    Feels better; on zosyn currently.      History reviewed. No pertinent past medical history.    Past Surgical History:   Procedure Laterality Date    BACK SURGERY       SECTION      CYSTOSCOPY W/ URETERAL STENT PLACEMENT Left 10/3/2023    Procedure: CYSTOSCOPY URETERAL CATHETER/STENT INSERTION;  Surgeon: Jimmy Schroeder MD;  [facility]  Service: Urology;  Laterality: Left;       Family History   Problem Relation Age of Onset    Heart disease Father        Social History     Socioeconomic History    Marital status: Single   Tobacco Use    Smoking status: Never    Smokeless tobacco: Never   Vaping Use    Vaping Use: Never used   Substance and Sexual Activity    Alcohol use: No    Drug use: No    Sexual activity: Defer       Allergies   Allergen Reactions    No Known Drug Allergy          Medication:    Current Facility-Administered Medications:     acetaminophen (TYLENOL) tablet 650 mg, 650 mg, Oral, Q4H PRN, Jose Moore PA-C, 650 mg at 10/04/23 0611    sennosides-docusate (PERICOLACE) 8.6-50 MG per tablet 2 tablet, 2 tablet, Oral, BID, 2 tablet at 10/04/23 0855 **AND** polyethylene glycol (MIRALAX) packet 17 g, 17 g, Oral, Daily PRN **AND** bisacodyl (DULCOLAX) EC tablet 5 mg, 5 mg, Oral, Daily PRN **AND** bisacodyl (DULCOLAX)  oriented to person, place and time suppository 10 mg, 10 mg, Rectal, Daily PRN, Rafael Mooreia L, PA-C    lactated ringers infusion, 100 mL/hr, Intravenous, Continuous, Jose Moore, PA-C, Last Rate: 100 mL/hr at 10/04/23 0219, 100 mL/hr at 10/04/23 0219    lactobacillus acidophilus (RISAQUAD) capsule 1 capsule, 1 capsule, Oral, Daily, Jose Moore, PA-C, 1 capsule at 10/04/23 0838    melatonin tablet 5 mg, 5 mg, Oral, Nightly PRN, Jose Moore, PA-C    morphine injection 2 mg, 2 mg, Intravenous, Q2H PRN, Jimmy Schroeder MD    ondansetron (ZOFRAN) injection 4 mg, 4 mg, Intravenous, Q6H PRN, Jose Moore, PA-C    oxyCODONE-acetaminophen (PERCOCET) 5-325 MG per tablet 1 tablet, 1 tablet, Oral, Q6H PRN, Jimmy Schroeder MD, 1 tablet at 10/04/23 0837    Pharmacy to dose vancomycin, , Does not apply, Continuous PRN, Jose Moore, PA-C    piperacillin-tazobactam (ZOSYN) 3.375 g in iso-osmotic dextrose 50 ml (premix), 3.375 g, Intravenous, Q8H, Rafael Mooreia ADITYA, PA-C, 3.375 g at 10/04/23 0838    sodium chloride 0.9 % flush 10 mL, 10 mL, Intravenous, PRN, Jimmy Schroeder MD    sodium chloride 0.9 % flush 10 mL, 10 mL, Intravenous, Q12H, Rafael Mooreia ADITYA, PA-C, 10 mL at 10/04/23 0057    sodium chloride 0.9 % flush 10 mL, 10 mL, Intravenous, PRN, Jose Moore, PA-C    sodium chloride 0.9 % infusion 40 mL, 40 mL, Intravenous, PRN, Rafael Mooreia L, PA-C    tamsulosin (FLOMAX) 24 hr capsule 0.4 mg, 0.4 mg, Oral, Daily, Jimmy Schroeder MD, 0.4 mg at 10/04/23 0837    vancomycin IVPB 1500 mg in 0.9% NaCl (Premix) 500 mL, 15 mg/kg, Intravenous, BID, Terry Desai, PharmD    Antibiotics:  Anti-Infectives (From admission, onward)      Ordered     Dose/Rate Route Frequency Start Stop    10/04/23 1251  vancomycin IVPB 1500 mg in 0.9% NaCl (Premix) 500 mL        Ordering Provider: Terry Desai, PharmD    15 mg/kg × 92.9 kg  333.3 mL/hr over 90 Minutes Intravenous 2 Times Daily 10/04/23 1400 10/09/23 1150     10/04/23 0141  piperacillin-tazobactam (ZOSYN) 3.375 g in iso-osmotic dextrose 50 ml (premix)        Ordering Provider: Jose Moore PA-C    3.375 g  over 4 Hours Intravenous Every 8 Hours 10/04/23 0830 10/11/23 0829    10/04/23 0151  vancomycin 1750 mg/500 mL 0.9% NS IVPB ([facility])        Ordering Provider: Merle Barfield RPH    20 mg/kg × 92.9 kg  over 105 Minutes Intravenous Once 10/04/23 0245 10/04/23 0447    10/04/23 0141  piperacillin-tazobactam (ZOSYN) 3.375 g in iso-osmotic dextrose 50 ml (premix)        Ordering Provider: Jose Moore PA-C    3.375 g  over 30 Minutes Intravenous Once 10/04/23 0230 10/04/23 0250    10/04/23 0141  Pharmacy to dose vancomycin        Ordering Provider: Jose Moore PA-C     Does not apply Continuous PRN 10/04/23 0140 10/11/23 0139    10/03/23 1947  cefTRIAXone (ROCEPHIN) 2000 mg/100 mL 0.9% NS IVPB ([facility])        Ordering Provider: Arpit Jackson MD    2,000 mg  over 30 Minutes Intravenous Once 10/03/23 2100 10/03/23 2220              Review of Systems:  Remarkable for fever, chills, fatigue, nausea, vomiting, flank pain (left)      Physical Exam:   Vital Signs  Temp (24hrs), Av.5 °F (38.1 °C), Min:99 °F (37.2 °C), Max:103.1 °F (39.5 °C)    Temp  Min: 99 °F (37.2 °C)  Max: 103.1 °F (39.5 °C)  BP  Min: 103/51  Max: 135/78  Pulse  Min: 80  Max: 118  Resp  Min: 16  Max: 22  SpO2  Min: 93 %  Max: 100 %    GENERAL: Awake and alert, in no acute distress.   HEENT: Normocephalic, atraumatic.  PERRL. EOMI. No conjunctival injection. No icterus. No ext oral lesions     HEART: RRR; No murmur, rubs, gallops.   LUNGS: Clear to auscultation bilaterally   ABDOMEN: Soft, nontender, guarding. NO mass or HSM.  EXT:  No cyanosis, clubbing or edema. No cord.  :  Without Merrill catheter.  MSK: No joint effusions or erythema  SKIN: Warm and dry without cutaneous eruptions on Inspection/palpation.    NEURO: Oriented to PPT.  Motor 5/5 strength  PSYCHIATRIC: Normal insight and  judgment. Cooperative with PE    Laboratory Data    Results from last 7 days   Lab Units 10/04/23  0342 10/03/23  2119 10/03/23  0228   WBC 10*3/mm3 15.06* 16.24* 17.13*   HEMOGLOBIN g/dL 10.6* 10.7* 11.4*   HEMATOCRIT % 33.6* 34.1 36.3   PLATELETS 10*3/mm3 297 287 356     Results from last 7 days   Lab Units 10/04/23  0342   SODIUM mmol/L 136   POTASSIUM mmol/L 3.8   CHLORIDE mmol/L 102   CO2 mmol/L 20.0*   BUN mg/dL 17   CREATININE mg/dL 1.49*   GLUCOSE mg/dL 130*   CALCIUM mg/dL 8.1*     Results from last 7 days   Lab Units 10/03/23  2119   ALK PHOS U/L 76   BILIRUBIN mg/dL 0.6   ALT (SGPT) U/L 12   AST (SGOT) U/L 22             Results from last 7 days   Lab Units 10/03/23  2119   LACTATE mmol/L 1.4             Estimated Creatinine Clearance: 59.7 mL/min (A) (by C-G formula based on SCr of 1.49 mg/dL (H)).      Microbiology:  Blood Culture   Date Value Ref Range Status   10/03/2023 Abnormal Stain (C)  Preliminary     BCID, PCR   Date Value Ref Range Status   10/03/2023 Proteus spp. Identification by BCID2 PCR. (A) Negative by BCID PCR. Culture to Follow. Final     No results found for: CULTURES, HSVCX, URCX  No results found for: EYECULTURE, GCCX, HSVCULTURE, LABHSV  No results found for: LEGIONELLA, MRSACX, MUMPSCX, MYCOPLASCX  No results found for: NOCARDIACX, STOOLCX  No results found for: THROATCX, UNSTIMCULT, URINECX, CULTURE, VZVCULTUR  No results found for: VIRALCULTU, WOUNDCX        Radiology:  Imaging Results (Last 72 Hours)       Procedure Component Value Units Date/Time    FL C Arm During Surgery [[id number]] Resulted: 10/03/23 2233     Updated: 10/03/23 2233    Narrative:      This procedure was auto-finalized with no dictation required.              Impression:   Left ureteral stone with ureteral obstruction  Left pyelonephritis  Gnr bacteremia Proteus by pcr  Nausea/vomiting  Leukocytosis with neutrophilia    PLAN/RECOMMENDATIONS:   Thank you for asking us to see Salma Strickland, I recommend the  following:  [employer] PCR is predicting Proteus species apparently resistant genes for negative although they are not listed how extensive this work-up is    Patient had hectic fevers on arrival of 100.1 temperature is descending down to 100.2 hemodynamics are stable patient has had source control with ureteral stent.  Mild purulent drainage after guided bladder and stent placement was encountered.    Patient given ceftriaxone followed by Zosyn.    Patient is on empiric vancomycin because of fevers    DC vancomycin    According to [facility] antibiogram proteus is susc to ceftriaxone 97% of time    D/c zosyn  Resume rocephin 2 g iv daily    Discussed with family we will recommend 7-14 days of abx.    Source control with ureteral stent very significant.    Will consider oral options upon discharge depending on susceptibility patterns               Jimy Rand MD  10/4/2023  13:37 EDT

## 2023-10-04 NOTE — ANESTHESIA PROCEDURE NOTES
Airway  Urgency: elective    Date/Time: 10/3/2023 10:18 PM  Airway not difficult    General Information and Staff    Patient location during procedure: OR  Anesthesiologist: Ash Gonzalez MD    Indications and Patient Condition  Indications for airway management: airway protection    Preoxygenated: yes  MILS not maintained throughout  Mask difficulty assessment: 1 - vent by mask    Final Airway Details  Final airway type: endotracheal airway      Successful airway: ETT  Cuffed: yes   Successful intubation technique: direct laryngoscopy and RSI  Endotracheal tube insertion site: oral  Blade: Luke  Blade size: 3  ETT size (mm): 7.5  Cormack-Lehane Classification: grade I - full view of glottis  Placement verified by: chest auscultation and capnometry   Measured from: lips  ETT/EBT  to lips (cm): 20  Number of attempts at approach: 1  Assessment: lips, teeth, and gum same as pre-op and atraumatic intubation    Additional Comments  Negative epigastric sounds, Breath sound equal bilaterally with symmetric chest rise and fall        
done

## 2023-10-04 NOTE — ANESTHESIA POSTPROCEDURE EVALUATION
Patient: Salma Strickland    Procedure Summary       Date: 10/03/23 Room / Location:  [facility] /  [facility]    Anesthesia Start: 2212 Anesthesia Stop: 2239    Procedure: CYSTOSCOPY URETERAL CATHETER/STENT INSERTION (Left: Bladder) Diagnosis:     Surgeons: Jimmy Schroeder MD Provider: Ash Gonzalez MD    Anesthesia Type: general ASA Status: 3 - Emergent            Anesthesia Type: general    Vitals  No vitals data found for the desired time range.          Post Anesthesia Care and Evaluation    Patient location during evaluation: PACU  Patient participation: complete - patient participated  Level of consciousness: awake and alert  Pain management: adequate    Airway patency: patent  Anesthetic complications: No anesthetic complications  PONV Status: none  Cardiovascular status: hemodynamically stable and acceptable  Respiratory status: nonlabored ventilation, acceptable and nasal cannula  Hydration status: acceptable  No anesthesia care post op

## 2023-10-04 NOTE — H&P
[facility] Medicine Services  HISTORY AND PHYSICAL    Patient Name: Salma Strickland  : 1980  MRN: [id number]  Primary Care Physician: Job Gamez MD  Date of admission: 10/3/2023    Subjective   Subjective     Chief Complaint:  Left flank pain    HPI:  Salma Strickland is a 43 y.o. female with a past medical history significant for PCOS and heavy periods. Returns today with persistent left flank pain and positive blood cultures. She was initially seen in ED 10/2/23 with complaints of left flank pain going on for the past 3 days. Characterized as sharp with radiation to LLQ abdomen. At that time noted subjective fever, malaise and intractable nausea with non bloody vomiting. Symptoms not relieved with Ibuprofen and heating pad. Came in for further evaluation and treatment. W/u at that time demonstrated obstructive left ureteral stone with perinephritic stranding. UA was unremarkable. Patient was ultimately discharged home Flomax with oxycodone and Zofran. Blood cultures returned today isolating gram negative rods. Patient was subsequently called and instructed to return to ED for further evaluation and treatment.  Urology was consulted, Dr. Schroeder. Patient was immediately taken to OR for cystoscopy with left ureteral stent placement and fluoroscopy. Now admitting to inpatient for bacteremia. Patient is currently hemodynamically stable. Denies cough, congestion, SOB, or chest pain. Nausea is improved. No abdominal pain, dysuria, or hematuria.      Review of Systems   Constitutional:  Positive for chills, fatigue and fever.   HENT:  Negative for congestion and trouble swallowing.    Eyes:  Negative for photophobia and visual disturbance.   Respiratory:  Negative for cough and shortness of breath.    Cardiovascular:  Negative for chest pain and leg swelling.   Gastrointestinal:  Positive for nausea and vomiting. Negative for abdominal pain and diarrhea.   Endocrine: Negative for  cold intolerance and heat intolerance.   Genitourinary:  Positive for flank pain. Negative for dysuria and hematuria.   Musculoskeletal:  Negative for arthralgias and gait problem.   Skin:  Negative for pallor and rash.   Allergic/Immunologic: Negative for immunocompromised state.   Neurological:  Negative for dizziness, weakness and headaches.   Hematological:  Negative for adenopathy.   Psychiatric/Behavioral:  Negative for agitation and confusion.               Personal History     History reviewed. No pertinent past medical history.          Past Surgical History:   Procedure Laterality Date    BACK SURGERY       SECTION         Family History: family history includes Heart disease in her father.     Social History:  reports that she has never smoked. She has never used smokeless tobacco. She reports that she does not drink alcohol and does not use drugs.  Social History     Social History Narrative    Not on file       Medications:  ondansetron ODT, oxyCODONE, and tamsulosin    Allergies   Allergen Reactions    No Known Drug Allergy        Objective   Objective     Vital Signs:   Temp:  [98.7 °F (37.1 °C)-103.1 °F (39.5 °C)] 99 °F (37.2 °C)  Heart Rate:  [] 94  Resp:  [16-22] 22  BP: (109-135)/(58-85) 113/58  Flow (L/min):  [2] 2    Physical Exam   Constitutional: Awake, alert  Eyes: PERRLA, sclerae anicteric, no conjunctival injection  HENT: NCAT, mucous membranes moist  Neck: Supple, no thyromegaly, no lymphadenopathy, trachea midline  Respiratory: Clear to auscultation bilaterally, nonlabored respirations   Cardiovascular: RRR, no murmurs, rubs, or gallops, palpable pedal pulses bilaterally  Gastrointestinal: Positive bowel sounds, soft, nontender, nondistended  Musculoskeletal: No bilateral ankle edema, no clubbing or cyanosis to extremities  Psychiatric: Appropriate affect, cooperative  Neurologic: Oriented x 3, strength symmetric in all extremities, Cranial Nerves grossly intact to  confrontation, speech clear  Skin: No rashes      Result Review:  I have personally reviewed the results from the time of this admission to 10/4/2023 01:54 EDT and agree with these findings:  [x]  Laboratory list / accordion  []  Microbiology  []  Radiology  []  EKG/Telemetry   []  Cardiology/Vascular   []  Pathology  [x]  Old records  []  Other:  Most notable findings include: febrile to 103.1. . /58. Creatinine 1.7. BUN 18. WBC 16.24. imaging noted above    LAB RESULTS:      Lab 10/03/23  2119 10/03/23  0228   WBC 16.24* 17.13*   HEMOGLOBIN 10.7* 11.4*   HEMATOCRIT 34.1 36.3   PLATELETS 287 356   NEUTROS ABS 15.18* 15.30*   IMMATURE GRANS (ABS) 0.08* 0.06*   LYMPHS ABS 0.51* 0.77   MONOS ABS 0.45 0.96*   EOS ABS 0.00 0.02   MCV 74.6* 75.5*   LACTATE 1.4 1.8         Lab 10/03/23  2119 10/03/23  0420   SODIUM 133* 136   POTASSIUM 3.8 4.1   CHLORIDE 99 101   CO2 22.0 25.0   ANION GAP 12.0 10.0   BUN 18 13   CREATININE 1.73* 1.36*   EGFR 37.4* 50.0*   GLUCOSE 125* 129*   CALCIUM 8.6 8.8   HEMOGLOBIN A1C 6.60*  --          Lab 10/03/23  2119 10/03/23  0420   TOTAL PROTEIN 6.7 7.5   ALBUMIN 3.8 3.9   GLOBULIN 2.9 3.6   ALT (SGPT) 12 9   AST (SGOT) 22 19   BILIRUBIN 0.6 0.5   ALK PHOS 76 82   LIPASE  --  31                     Brief Urine Lab Results  (Last result in the past 365 days)        Color   Clarity   Blood   Leuk Est   Nitrite   Protein   CREAT   Urine HCG        10/03/23 0219               Negative       10/03/23 0219 Yellow   Clear   Negative   Negative   Negative   Negative                 Microbiology Results (last 10 days)       Procedure Component Value - Date/Time    Blood Culture - Blood, Hand, Left [[id number]]  (Abnormal) Collected: 10/03/23 0430    Lab Status: Preliminary result Specimen: Blood from Hand, Left Updated: 10/03/23 2029     Blood Culture Abnormal Stain     Gram Stain Anaerobic Bottle Gram negative bacilli      Aerobic Bottle Gram negative bacilli    Blood Culture - Blood, Arm,  Left [[id number]]  (Abnormal) Collected: 10/03/23 0420    Lab Status: Preliminary result Specimen: Blood from Arm, Left Updated: 10/03/23 2130     Blood Culture Abnormal Stain     Gram Stain Anaerobic Bottle Gram negative bacilli      Aerobic Bottle Gram negative bacilli    Blood Culture ID, PCR - Blood, Arm, Left [[id number]]  (Abnormal) Collected: 10/03/23 0420    Lab Status: Final result Specimen: Blood from Arm, Left Updated: 10/03/23 1727     BCID, PCR Proteus spp. Identification by BCID2 PCR.     BCID, PCR 2 Enterobacterales spp. Identification by BCID2 PCR.     BOTTLE TYPE Anaerobic Bottle    Narrative:      No resistance genes detected.            CT Abdomen Pelvis With Contrast    Result Date: 10/3/2023  CT ABDOMEN PELVIS W CONTRAST Date of Exam: 10/3/2023 3:23 AM EDT Indication: LLQ abdominal pain. Comparison: None available. Technique: Axial CT images were obtained of the abdomen and pelvis following the uneventful intravenous administration of 100 mL Isovue-300. Reconstructed coronal and sagittal images were also obtained. Automated exposure control and iterative construction methods were used. Findings: The lung bases are clear. There is a moderate sized hiatal hernia. The abdominal structures of the upper abdomen are obscured by the presence of beam hardening artifact from posterior lumbar/thoracic fusion. The liver, bilateral adrenal glands, right kidney, pancreas and spleen are normal. There are small layering stones in the gallbladder. There is an abnormal appearance of the left kidney which has a delayed nephrogram with a tiny 3 mm proximal left ureteral stone. The delayed nephrogram may be secondary to pyelonephritis or changes from decreased excretion from the obstruction. There is another 2 mm nonobstructing stone of the lower pole the left kidney. There is a small periumbilical hernia containing only fat. There is an intrauterine device in place. The ovaries and uterus are otherwise normal. There  are no acute osseous abnormalities with postsurgical changes from thoracolumbar fusion. There is a small cystic area seen in the right lower perineum likely a Bartholin's cyst.     Impression: Impression: 3 mm proximal left ureteral stone with delayed left nephrogram and perinephric stranding. The findings are suggestive of left-sided pyelonephritis secondary to an obstructing proximal left ureteral stone. Electronically Signed: Zen Vale MD  10/3/2023 3:52 AM EDT  Workstation ID: [id number]    FL C Arm During Surgery    Result Date: 10/3/2023  This procedure was auto-finalized with no dictation required.         Assessment & Plan   Assessment & Plan       Sepsis    Nephrolithiasis    CHATO (acute kidney injury)    Pyelonephritis    Bacteremia    Anemia    44 yo healthy female here with Sepsis, bacteremia 2/2 left pyelonephritis with obstructing left ureteral stone and hydronephrosis. S/P Fluoroscopy and cystoscopy with left ureteral stent placement per urology (Monni) today.    Sepsis Secondary to Pyelonephritis  Bacteremia  Left ureteral stone with hydronephrosis  - stable and non toxic appearing  - S/P Fluoroscopy and cystoscopy with left ureteral stent placement per urology (Monni) today, 10/3  - blood cultures isolated Proteus, gram negative rods  - repeat cultures, add urine culture  - was started on rocephin, change to vancomycin and zosyn pending culture repeat  - continue Flomax  - consider ID input as needed  - aggressive IV hydration  - pain and nausea control  - close monitor on telemetry  - am labs    CHATO  - post-renal secondary to obstruction  - avoid nephrotoxins  - bladder scan and straight cath as needed  - strict I&O    Anemia  - baseline hemoglobin ~ 12.8  - check iron studies    DVT prophylaxis:  mechanical    CODE STATUS:  full code  Level Of Support Discussed With: Patient  Code Status (Patient has no pulse and is not breathing): CPR (Attempt to Resuscitate)  Medical Interventions (Patient has  pulse or is breathing): Full Support      Expected Discharge  TBD    This note has been completed as part of a split-shared workflow.     Signature: Electronically signed by Jose Moore PA-C, 10/04/23, 1:55 AM EDT      Total time spent: 90 minutes  Time spent includes time reviewing chart, face-to-face time, counseling patient/family/caregiver, ordering medications/tests/procedures, communicating with other health care professionals, documenting clinical information in the electronic health record, and coordination of care.      Attending   Admission Attestation       I have performed an independent face-to-face diagnostic evaluation including performing an independent physical examination.  The documented plan of care above was reviewed and developed with the advanced practice clinician (APC).  I have updated the HPI as appropriate.    Brief HPI    43 F who was seen in the ED on Monday 10/2 and evaluated for left-sided flank pain which she has had for 3 days prior to that visit.  She describes the pain as sharp, can cite no exacerbating or alleviating factors, confirms radiation to the left lower aspect of her abdomen, she confirms fever and occasional chills, and nausea with emesis; no destin blood in the vomitus material.  During that ED visit on Monday, imaging showed left ureteral obstructing stone.  She was discharged home with Flomax, pain medication.  Today (Tuesday), she was called and told to come back to the ED because blood culture had returned a positive result for gram-negative bacilli.  She was febrile in the ED and slightly tachycardic, so she is being readmitted, and will be taken to the OR tonight by the urology service for urgent cystoscopy and left ureteral stent placement.    Attending Physical Exam:  Temp:  [99 °F (37.2 °C)-103.1 °F (39.5 °C)] 99 °F (37.2 °C)  Heart Rate:  [] 94  Resp:  [16-22] 22  BP: (109-135)/(58-78) 113/58  Flow (L/min):  [2] 2    Constitutional: Awake, alert,  NAD.  Eyes: PERRLA, sclerae anicteric, no conjunctival injection  HENT: NCAT, mucous membranes moist  Neck: Supple, no thyromegaly, no lymphadenopathy, trachea midline  Respiratory: Clear to auscultation bilaterally, nonlabored respirations   Cardiovascular: RRR, no murmurs, rubs, or gallops, palpable pedal pulses bilaterally  Gastrointestinal: Positive bowel sounds, soft, nontender, nondistended  Musculoskeletal: No bilateral ankle edema, no clubbing or cyanosis to extremities; no flank pain on the left on palpation.  Psychiatric: Appropriate affect, cooperative  Neurologic: Oriented x 3, strength symmetric in all extremities, Cranial Nerves grossly intact to confrontation, speech clear  Skin: No rashes, normal turgor.    Assessment and Plan:    See assessment and plan documented by APC above and updated/edited by me as appropriate.    Total time spent: 22 minutes  Time spent includes time reviewing chart, face-to-face time, counseling patient/family/caregiver, ordering medications/tests/procedures, communicating with other health care professionals, documenting clinical information in the electronic health record, and coordination of care.       King Hankins III, DO  10/04/23

## 2023-10-04 NOTE — SIGNIFICANT NOTE
10/04/23 1546   Living Situation   Current Living Arrangements home   Potentially Unsafe Housing Conditions none   Food Insecurity   Within the past 12 months, you worried that your food would run out before you got the money to buy more. Never true   Within the past 12 months, the food you bought just didn't last and you didn't have money to get more. Never true   Transportation Needs   In the past 12 months, has lack of transportation kept you from medical appointments or from getting medications? no   In the past 12 months, has lack of transportation kept you from meetings, work, or from getting things needed for daily living? No   Utilities   In the past 12 months has the electric, gas, oil, or water company threatened to shut off services in your home? No   Abuse Screen (yes response referral indicated)   Feels Unsafe at Home or Work/School no   Feels Threatened by Someone no   Does Anyone Try to Keep You From Having Contact with Others or Doing Things Outside Your Home? no   Physical Signs of Abuse Present no   Financial Resource Strain   How hard is it for you to pay for the very basics like food, housing, medical care, and heating? Not very   Employment   Do you want help finding or keeping work or a job? I do not need or want help   Family and Community Support   If for any reason you need help with day-to-day activities such as bathing, preparing meals, shopping, managing finances, etc., do you get the help you need? I don't need any help   How often do you feel lonely or isolated from those around you? Rarely   Education   Preferred Language English   Do you want help with school or training? For example, starting or completing job training or getting a high school diploma, GED or equivalent No   Physical Activity   On average, how many days per week do you engage in moderate to strenuous exercise (like a brisk walk)? 2 days   On average, how many minutes do you engage in exercise at this level? 20 min    Number of minutes of exercise per week (!) 40   Alcohol Use   Q1: How often do you have a drink containing alcohol? Never   Q2: How many drinks containing alcohol do you have on a typical day when you are drinking? None   Q3: How often do you have six or more drinks on one occasion? Never   Mental Health   Little Interest or Pleasure in Doing Things 0-->not at all   Feeling Down, Depressed or Hopeless 0-->not at all   Disabilities   Concentrating, Remembering or Making Decisions Difficulty no   Doing Errands Independently Difficulty (such as shopping) no

## 2023-10-04 NOTE — OP NOTE
Operative Note    Name: Salma Strickland   Age:43 y.o.   Sex: female  MRN: [id number]    DATE OF PROCEDURE: 10/03/2023    PREOPERATIVE DIAGNOSIS:     1.  Left ureteral stone with hydronephrosis  2.  Left pyelonephritis  3.  Bacteremia    POSTOPERATIVE DIAGNOSIS:  1.  Left ureteral stone with hydronephrosis  2.  Left pyelonephritis  3.  Bacteremia    PROCEDURE PERFORMED:  1.  Cystoscopy with left ureteral stent placement  2.  Fluoroscopy, supervision and interpretation    SURGEON: Jimmy Schroeder MD    ASSISTANT: None    ANESTHESIA: General    SPECIMEN: Urine for culture    FINDINGS: Normal-appearing urethra and bladder.  There was mild purulent drainage after guidewire and stent placement.  There was proper placement of the left stent as noted on fluoroscopy.    INDICATIONS FOR PROCEDURE:  43-year-old female with her first stone episode. A CT scan showed a 3 mm left proximal ureteral stone with delayed excretion and 2 mm left lower pole stone. She had leukocytosis with WBC 17.13 and mild elevated creatinine 1.36. The urinalysis was negative and the lactate was normal. Blood cultures showed gram-negative rods.  She had fever and tachycardia.  She is here for  cystoscopy and left stent placement.  She started Rocephin.    DRAINS: Left ureteral stent 6 Pakistani by 24 cm, strings removed    DESCRIPTION OF PROCEDURE: After informed consent, the patient was taken to the operating room in stable condition.  Anesthesia was induced without complications.  She was placed in a lithotomy position.  The genitalia and groins were prepped and draped.  A timeout was taken to identify the correct patient and procedure and side.  A 22 Pakistani cystoscope was placed per urethra into the bladder.  Inspection revealed a normal bladder.  A 0.035 sensor guidewire was inserted into the left ureter and advanced up to the level of the kidney under fluoroscopy guidance without difficulty.  There was mild purulent drainage after guidewire  placement.  A 6 Serbian by 24 cm double-J ureteral stent was placed over the wire and advanced up to the level of the kidney under fluoroscopy guidance without difficulty.  The wire was removed and there was good curl in the renal pelvis and in the bladder.  The strings had been removed.  A urine specimen was collected for culture.  The bladder was drained.  The scope was removed.  She was awakened from anesthesia and taken recovery room in satisfactory condition.    Estimated blood loss: None    Complications: None    DISPOSITION: She will go to the PACU and subsequently to the floor for routine postoperative care.    Jimmy Schroeder MD  -  10/3/2023, 22:37 EDT   Cyclophosphamide Counseling:  I discussed with the patient the risks of cyclophosphamide including but not limited to hair loss, hormonal abnormalities, decreased fertility, abdominal pain, diarrhea, nausea and vomiting, bone marrow suppression and infection. The patient understands that monitoring is required while taking this medication.

## 2023-10-05 ENCOUNTER — APPOINTMENT (OUTPATIENT)
Dept: CT IMAGING | Facility: HOSPITAL | Age: 43
DRG: 854 | End: 2023-10-05
Payer: MEDICAID

## 2023-10-05 LAB
ANION GAP SERPL CALCULATED.3IONS-SCNC: 10 MMOL/L (ref 5–15)
BACTERIA SPEC AEROBE CULT: ABNORMAL
BASOPHILS # BLD AUTO: 0.02 10*3/MM3 (ref 0–0.2)
BASOPHILS NFR BLD AUTO: 0.2 % (ref 0–1.5)
BUN SERPL-MCNC: 12 MG/DL (ref 6–20)
BUN/CREAT SERPL: 9.7 (ref 7–25)
CALCIUM SPEC-SCNC: 7.9 MG/DL (ref 8.6–10.5)
CHLORIDE SERPL-SCNC: 102 MMOL/L (ref 98–107)
CO2 SERPL-SCNC: 24 MMOL/L (ref 22–29)
CREAT SERPL-MCNC: 1.24 MG/DL (ref 0.57–1)
DEPRECATED RDW RBC AUTO: 39 FL (ref 37–54)
EGFRCR SERPLBLD CKD-EPI 2021: 55.8 ML/MIN/1.73
EOSINOPHIL # BLD AUTO: 0.01 10*3/MM3 (ref 0–0.4)
EOSINOPHIL NFR BLD AUTO: 0.1 % (ref 0.3–6.2)
ERYTHROCYTE [DISTWIDTH] IN BLOOD BY AUTOMATED COUNT: 14.6 % (ref 12.3–15.4)
GLUCOSE SERPL-MCNC: 113 MG/DL (ref 65–99)
GRAM STN SPEC: ABNORMAL
HCT VFR BLD AUTO: 31.1 % (ref 34–46.6)
HGB BLD-MCNC: 9.6 G/DL (ref 12–15.9)
IMM GRANULOCYTES # BLD AUTO: 0.03 10*3/MM3 (ref 0–0.05)
IMM GRANULOCYTES NFR BLD AUTO: 0.3 % (ref 0–0.5)
ISOLATED FROM: ABNORMAL
LYMPHOCYTES # BLD AUTO: 0.86 10*3/MM3 (ref 0.7–3.1)
LYMPHOCYTES NFR BLD AUTO: 9.6 % (ref 19.6–45.3)
MCH RBC QN AUTO: 22.9 PG (ref 26.6–33)
MCHC RBC AUTO-ENTMCNC: 30.9 G/DL (ref 31.5–35.7)
MCV RBC AUTO: 74.2 FL (ref 79–97)
MONOCYTES # BLD AUTO: 0.46 10*3/MM3 (ref 0.1–0.9)
MONOCYTES NFR BLD AUTO: 5.1 % (ref 5–12)
NEUTROPHILS NFR BLD AUTO: 7.61 10*3/MM3 (ref 1.7–7)
NEUTROPHILS NFR BLD AUTO: 84.7 % (ref 42.7–76)
NRBC BLD AUTO-RTO: 0 /100 WBC (ref 0–0.2)
PLATELET # BLD AUTO: 222 10*3/MM3 (ref 140–450)
PMV BLD AUTO: 9.5 FL (ref 6–12)
POTASSIUM SERPL-SCNC: 3.6 MMOL/L (ref 3.5–5.2)
RBC # BLD AUTO: 4.19 10*6/MM3 (ref 3.77–5.28)
SODIUM SERPL-SCNC: 136 MMOL/L (ref 136–145)
VANCOMYCIN SERPL-MCNC: 36.9 MCG/ML (ref 5–40)
WBC NRBC COR # BLD: 8.99 10*3/MM3 (ref 3.4–10.8)

## 2023-10-05 PROCEDURE — 70450 CT HEAD/BRAIN W/O DYE: CPT

## 2023-10-05 PROCEDURE — 25010000002 ONDANSETRON PER 1 MG: Performed by: PHYSICIAN ASSISTANT

## 2023-10-05 PROCEDURE — 85025 COMPLETE CBC W/AUTO DIFF WBC: CPT

## 2023-10-05 PROCEDURE — 25010000002 CEFTRIAXONE PER 250 MG: Performed by: INTERNAL MEDICINE

## 2023-10-05 PROCEDURE — 25810000003 SODIUM CHLORIDE 0.9 % SOLUTION

## 2023-10-05 PROCEDURE — 25810000003 LACTATED RINGERS PER 1000 ML: Performed by: PHYSICIAN ASSISTANT

## 2023-10-05 PROCEDURE — 80048 BASIC METABOLIC PNL TOTAL CA: CPT

## 2023-10-05 PROCEDURE — 99232 SBSQ HOSP IP/OBS MODERATE 35: CPT | Performed by: INTERNAL MEDICINE

## 2023-10-05 PROCEDURE — 25010000002 VANCOMYCIN 10 G RECONSTITUTED SOLUTION

## 2023-10-05 PROCEDURE — 80202 ASSAY OF VANCOMYCIN: CPT

## 2023-10-05 RX ADMIN — CEFTRIAXONE 2000 MG: 2 INJECTION, POWDER, FOR SOLUTION INTRAMUSCULAR; INTRAVENOUS at 20:34

## 2023-10-05 RX ADMIN — ACETAMINOPHEN, ASPIRIN, CAFFEINE 2 TABLET: 250; 65; 250 TABLET, FILM COATED ORAL at 10:08

## 2023-10-05 RX ADMIN — ONDANSETRON 4 MG: 2 INJECTION INTRAMUSCULAR; INTRAVENOUS at 11:19

## 2023-10-05 RX ADMIN — VANCOMYCIN HYDROCHLORIDE 1500 MG: 10 INJECTION, POWDER, LYOPHILIZED, FOR SOLUTION INTRAVENOUS at 00:12

## 2023-10-05 RX ADMIN — ACETAMINOPHEN 650 MG: 325 TABLET ORAL at 18:20

## 2023-10-05 RX ADMIN — STANDARDIZED SENNA CONCENTRATE AND DOCUSATE SODIUM 2 TABLET: 8.6; 5 TABLET, FILM COATED ORAL at 20:34

## 2023-10-05 RX ADMIN — OXYCODONE HYDROCHLORIDE AND ACETAMINOPHEN 1 TABLET: 5; 325 TABLET ORAL at 11:10

## 2023-10-05 RX ADMIN — OXYCODONE HYDROCHLORIDE AND ACETAMINOPHEN 1 TABLET: 5; 325 TABLET ORAL at 03:36

## 2023-10-05 RX ADMIN — SODIUM CHLORIDE, POTASSIUM CHLORIDE, SODIUM LACTATE AND CALCIUM CHLORIDE 100 ML/HR: 600; 310; 30; 20 INJECTION, SOLUTION INTRAVENOUS at 16:34

## 2023-10-05 RX ADMIN — TAMSULOSIN HYDROCHLORIDE 0.4 MG: 0.4 CAPSULE ORAL at 08:36

## 2023-10-05 RX ADMIN — OXYCODONE HYDROCHLORIDE AND ACETAMINOPHEN 1 TABLET: 5; 325 TABLET ORAL at 18:20

## 2023-10-05 RX ADMIN — Medication 10 ML: at 20:34

## 2023-10-05 RX ADMIN — STANDARDIZED SENNA CONCENTRATE AND DOCUSATE SODIUM 2 TABLET: 8.6; 5 TABLET, FILM COATED ORAL at 08:36

## 2023-10-05 RX ADMIN — Medication 1 CAPSULE: at 08:35

## 2023-10-05 RX ADMIN — SODIUM CHLORIDE, POTASSIUM CHLORIDE, SODIUM LACTATE AND CALCIUM CHLORIDE 100 ML/HR: 600; 310; 30; 20 INJECTION, SOLUTION INTRAVENOUS at 06:20

## 2023-10-05 NOTE — PROGRESS NOTES
[facility] Medicine Services  PROGRESS NOTE    Patient Name: Salma Strickland  : 1980  MRN: [id number]    Date of Admission: 10/3/2023  Primary Care Physician: Job Gamez MD    Subjective   Subjective     CC:  Bacteremia, HA     HPI:  No acute events. Febrile overnight. Still with significant HA.       Objective   Objective     Vital Signs:   Temp:  [99.1 °F (37.3 °C)-103.2 °F (39.6 °C)] 99.9 °F (37.7 °C)  Heart Rate:  [] 74  Resp:  [16-22] 18  BP: ()/(62-82) 141/80     Physical Exam:  Constitutional: No acute distress, awake, alert  HENT: NCAT, mucous membranes moist  Respiratory: Clear to auscultation bilaterally, respiratory effort normal   Cardiovascular: RRR, no murmurs, rubs, or gallops  Gastrointestinal: Positive bowel sounds, soft, nontender, nondistended  Musculoskeletal: No bilateral ankle edema  Psychiatric: Appropriate affect, cooperative  Neurologic: Oriented x 3, strength symmetric in all extremities, Cranial Nerves grossly intact to confrontation, speech clear  Skin: No rashes      Results Reviewed:  LAB RESULTS:      Lab 10/05/23  0430 10/04/23  0342 10/03/23  2119 10/03/23  0228   WBC 8.99 15.06* 16.24* 17.13*   HEMOGLOBIN 9.6* 10.6* 10.7* 11.4*   HEMATOCRIT 31.1* 33.6* 34.1 36.3   PLATELETS 222 297 287 356   NEUTROS ABS 7.61*  --  15.18* 15.30*   IMMATURE GRANS (ABS) 0.03  --  0.08* 0.06*   LYMPHS ABS 0.86  --  0.51* 0.77   MONOS ABS 0.46  --  0.45 0.96*   EOS ABS 0.01  --  0.00 0.02   MCV 74.2* 74.3* 74.6* 75.5*   LACTATE  --   --  1.4 1.8         Lab 10/05/23  0430 10/04/23  0342 10/03/23  2119 10/03/23  0420   SODIUM 136 136 133* 136   POTASSIUM 3.6 3.8 3.8 4.1   CHLORIDE 102 102 99 101   CO2 24.0 20.0* 22.0 25.0   ANION GAP 10.0 14.0 12.0 10.0   BUN 12 17 18 13   CREATININE 1.24* 1.49* 1.73* 1.36*   EGFR 55.8* 44.8* 37.4* 50.0*   GLUCOSE 113* 130* 125* 129*   CALCIUM 7.9* 8.1* 8.6 8.8   HEMOGLOBIN A1C  --   --  6.60*  --          Lab  10/03/23  2119 10/03/23  0420   TOTAL PROTEIN 6.7 7.5   ALBUMIN 3.8 3.9   GLOBULIN 2.9 3.6   ALT (SGPT) 12 9   AST (SGOT) 22 19   BILIRUBIN 0.6 0.5   ALK PHOS 76 82   LIPASE  --  31                 Lab 10/04/23  0342 10/03/23  2119   IRON 8*  --    IRON SATURATION (TSAT) 3*  --    TIBC 283*  --    TRANSFERRIN 190*  --    FERRITIN 211.90*  --    FOLATE  --  6.20   VITAMIN B 12  --  474         Brief Urine Lab Results  (Last result in the past 365 days)        Color   Clarity   Blood   Leuk Est   Nitrite   Protein   CREAT   Urine HCG        10/03/23 0219               Negative       10/03/23 0219 Yellow   Clear   Negative   Negative   Negative   Negative                   Microbiology Results Abnormal       None            CT Head Without Contrast    Result Date: 10/5/2023  CT HEAD WO CONTRAST Date of Exam: 10/5/2023 12:07 PM EDT Indication: Acute HA. Comparison: None available. Technique: Axial CT images were obtained of the head without contrast administration.  Automated exposure control and iterative construction methods were used. Findings: Parenchyma:No acute intraparenchymal hemorrhage. No loss of gray-white differentiation to suggest large territory infarct. Normal parenchymal volume. No substantial white matter disease. No midline shift or herniation. Ventricles and extra axial spaces:Normal caliber of ventricles and sulci. No extra axial fluid collection seen. Other:Orbits are grossly intact. Paranasal sinuses are clear. Mastoid air cells are clear. Calvarium is intact. No substantial intracranial atherosclerotic calcification.     Impression: Impression: No evidence of acute intracranial abnormality. Electronically Signed: Michael Virk MD  10/5/2023 12:29 PM EDT  Workstation ID: [id number]    FL C Arm During Surgery    Result Date: 10/3/2023  This procedure was auto-finalized with no dictation required.         Current medications:  Scheduled Meds:cefTRIAXone, 2,000 mg, Intravenous, Q24H  lactobacillus  acidophilus, 1 capsule, Oral, Daily  senna-docusate sodium, 2 tablet, Oral, BID  sodium chloride, 10 mL, Intravenous, Q12H  tamsulosin, 0.4 mg, Oral, Daily      Continuous Infusions:lactated ringers, 100 mL/hr, Last Rate: 100 mL/hr (10/05/23 0620)      PRN Meds:.  acetaminophen    aspirin-acetaminophen-caffeine    senna-docusate sodium **AND** polyethylene glycol **AND** bisacodyl **AND** bisacodyl    melatonin    Morphine    ondansetron    oxyCODONE-acetaminophen    sodium chloride    sodium chloride    sodium chloride    Assessment & Plan   Assessment & Plan     Active Hospital Problems    Diagnosis  POA    **Sepsis [A41.9]  Yes    CHATO (acute kidney injury) [N17.9]  Yes    Anemia [D64.9]  Yes    Pyelonephritis [N12]  Yes    Bacteremia [R78.81]  Yes    Nephrolithiasis [N20.0]  Yes      Resolved Hospital Problems   No resolved problems to display.        Brief Hospital Course to date:  44 yo healthy female here with Sepsis, bacteremia 2/2 left pyelonephritis with obstructing left ureteral stone and hydronephrosis. S/P Fluoroscopy and cystoscopy with left ureteral stent placement per urology (Monnig) today.     Sepsis Secondary to Pyelonephritis  Proteus Bacteremia POA  Left ureteral stone with hydronephrosis  - S/P Fluoroscopy and cystoscopy with left ureteral stent placement per urology (Monni) today, 10/3  -  Bcx 10/3 + proteus; Ucx with proteus   - Urology and ID consulted -- appreciate recs. Continue rocephin    - Continue IVF   - WBC trending down, however, remains intermittently febrile     HA  - Likely related to the above. CT head negative/sinus clear. Trial excedrin      CHATO  - post-renal secondary to obstruction  - Improved. Continue IVF     Anemia  - baseline hemoglobin ~ 12.8  - start iron supplement     Expected Discharge Location and Transportation: home  Expected Discharge   Expected Discharge Date: 10/6/2023; Expected Discharge Time:      DVT prophylaxis:  Mechanical DVT prophylaxis orders are  present.     AM-PAC 6 Clicks Score (PT): 24 (10/05/23 6636)    CODE STATUS:   Code Status and Medical Interventions:   Ordered at: 10/03/23 0527     Level Of Support Discussed With:    Patient     Code Status (Patient has no pulse and is not breathing):    CPR (Attempt to Resuscitate)     Medical Interventions (Patient has pulse or is breathing):    Full Support       Melody Turner DO  10/05/23

## 2023-10-05 NOTE — PLAN OF CARE
Goal Outcome Evaluation:      Shift Summary: Patient alert and oriented x4, able to make needs known. T max this shift 103.2, treated with tylenol. C/o pain intermittently and relieved with PRN pain meds. On room air, sinus rhythm to sinus tachycardia on monitor. IVF and IV antibiotics infusing. Voiding spontaneously, yellow concentrated urine with sediment. Family at bedside.      Problem: Adult Inpatient Plan of Care  Goal: Plan of Care Review  Outcome: Ongoing, Progressing  Goal: Patient-Specific Goal (Individualized)  Outcome: Ongoing, Progressing  Goal: Absence of Hospital-Acquired Illness or Injury  Outcome: Ongoing, Progressing  Intervention: Identify and Manage Fall Risk  Recent Flowsheet Documentation  Taken 10/5/2023 0400 by Salazar Gomez RN  Safety Promotion/Fall Prevention:   clutter free environment maintained   activity supervised   fall prevention program maintained   nonskid shoes/slippers when out of bed   safety round/check completed  Taken 10/5/2023 0200 by Salazar Gomez RN  Safety Promotion/Fall Prevention:   clutter free environment maintained   fall prevention program maintained   nonskid shoes/slippers when out of bed   safety round/check completed  Taken 10/5/2023 0000 by Salazar Gomez RN  Safety Promotion/Fall Prevention:   activity supervised   clutter free environment maintained   fall prevention program maintained   nonskid shoes/slippers when out of bed   safety round/check completed  Taken 10/4/2023 2200 by Salazar Gomez RN  Safety Promotion/Fall Prevention:   activity supervised   clutter free environment maintained   fall prevention program maintained   nonskid shoes/slippers when out of bed   safety round/check completed  Taken 10/4/2023 2005 by Salazar Gomez RN  Safety Promotion/Fall Prevention:   activity supervised   assistive device/personal items within reach   clutter free environment maintained   fall prevention program maintained   nonskid shoes/slippers when out of  bed   safety round/check completed  Intervention: Prevent Skin Injury  Recent Flowsheet Documentation  Taken 10/5/2023 0400 by Salazar Gomez RN  Body Position: position changed independently  Skin Protection: adhesive use limited  Taken 10/5/2023 0200 by Salazar Gomez RN  Body Position: position changed independently  Skin Protection: adhesive use limited  Taken 10/5/2023 0000 by Salazar Gomez RN  Body Position: position changed independently  Skin Protection:   adhesive use limited   incontinence pads utilized  Taken 10/4/2023 2200 by Salazar Gomez RN  Body Position: position changed independently  Skin Protection:   adhesive use limited   incontinence pads utilized  Taken 10/4/2023 2005 by Salazar Gomez RN  Body Position: position changed independently  Skin Protection:   adhesive use limited   incontinence pads utilized  Intervention: Prevent and Manage VTE (Venous Thromboembolism) Risk  Recent Flowsheet Documentation  Taken 10/5/2023 0400 by Salazar Gomez RN  Activity Management: up ad juan  VTE Prevention/Management:   bilateral   sequential compression devices on  Taken 10/5/2023 0200 by Salazar Gomez RN  Activity Management: up ad juan  Taken 10/5/2023 0000 by Salazar Gomez RN  Activity Management: up ad juan  VTE Prevention/Management:   bilateral   sequential compression devices on  Taken 10/4/2023 2200 by Salazar Gomez RN  Activity Management: up ad juan  Taken 10/4/2023 2005 by Salazar Gomez RN  Activity Management: up ad juan  VTE Prevention/Management:   bilateral   sequential compression devices on  Intervention: Prevent Infection  Recent Flowsheet Documentation  Taken 10/5/2023 0400 by Salazar Gomez RN  Infection Prevention: rest/sleep promoted  Taken 10/5/2023 0200 by Salazar Gomez RN  Infection Prevention:   environmental surveillance performed   rest/sleep promoted  Taken 10/5/2023 0000 by Salazar Gomez RN  Infection Prevention:   hand hygiene promoted   rest/sleep promoted  Taken 10/4/2023  2005 by Salazar Gomez RN  Infection Prevention:   environmental surveillance performed   hand hygiene promoted   rest/sleep promoted  Goal: Optimal Comfort and Wellbeing  Outcome: Ongoing, Progressing  Intervention: Monitor Pain and Promote Comfort  Recent Flowsheet Documentation  Taken 10/5/2023 0400 by Salazar Gomez RN  Pain Management Interventions: see MAR  Taken 10/4/2023 2005 by Salazar Gomez RN  Pain Management Interventions:   pain management plan reviewed with patient/caregiver   position adjusted   see MAR  Intervention: Provide Person-Centered Care  Recent Flowsheet Documentation  Taken 10/4/2023 2005 by Salazar Gomez RN  Trust Relationship/Rapport:   care explained   questions answered   questions encouraged   reassurance provided   thoughts/feelings acknowledged  Goal: Readiness for Transition of Care  Outcome: Ongoing, Progressing     Problem: Pain Acute  Goal: Acceptable Pain Control and Functional Ability  Outcome: Ongoing, Progressing  Intervention: Prevent or Manage Pain  Recent Flowsheet Documentation  Taken 10/5/2023 0000 by Salazar Gomez RN  Sleep/Rest Enhancement:   awakenings minimized   noise level reduced   room darkened  Taken 10/4/2023 2200 by Salazar Gomez RN  Sleep/Rest Enhancement:   awakenings minimized   family presence promoted   noise level reduced   room darkened  Taken 10/4/2023 2005 by Salazar Gomez RN  Bowel Elimination Promotion: adequate fluid intake promoted  Sleep/Rest Enhancement:   awakenings minimized   family presence promoted   noise level reduced   regular sleep/rest pattern promoted   room darkened  Intervention: Develop Pain Management Plan  Recent Flowsheet Documentation  Taken 10/5/2023 0400 by Salazar Gomez RN  Pain Management Interventions: see MAR  Taken 10/4/2023 2005 by Salazar Gomez RN  Pain Management Interventions:   pain management plan reviewed with patient/caregiver   position adjusted   see MAR  Intervention: Optimize Psychosocial  Wellbeing  Recent Flowsheet Documentation  Taken 10/4/2023 2005 by Salazar Gomez RN  Supportive Measures:   active listening utilized   relaxation techniques promoted  Diversional Activities:   smartphone   television     Problem: Adjustment to Illness (Sepsis/Septic Shock)  Goal: Optimal Coping  Outcome: Ongoing, Progressing  Intervention: Optimize Psychosocial Adjustment to Illness  Recent Flowsheet Documentation  Taken 10/4/2023 2005 by Salazar Gomez RN  Supportive Measures:   active listening utilized   relaxation techniques promoted  Family/Support System Care: involvement promoted     Problem: Bleeding (Sepsis/Septic Shock)  Goal: Absence of Bleeding  Outcome: Ongoing, Progressing     Problem: Glycemic Control Impaired (Sepsis/Septic Shock)  Goal: Blood Glucose Level Within Desired Range  Outcome: Ongoing, Progressing     Problem: Infection Progression (Sepsis/Septic Shock)  Goal: Absence of Infection Signs and Symptoms  Outcome: Ongoing, Progressing  Intervention: Initiate Sepsis Management  Recent Flowsheet Documentation  Taken 10/5/2023 0400 by Salazar Gomez RN  Infection Prevention: rest/sleep promoted  Taken 10/5/2023 0200 by Salazar Gomez RN  Infection Prevention:   environmental surveillance performed   rest/sleep promoted  Taken 10/5/2023 0000 by Salazar Gomez RN  Infection Prevention:   hand hygiene promoted   rest/sleep promoted  Taken 10/4/2023 2005 by Salazar Gomez RN  Infection Prevention:   environmental surveillance performed   hand hygiene promoted   rest/sleep promoted  Intervention: Promote Recovery  Recent Flowsheet Documentation  Taken 10/5/2023 0400 by Salazar Gomez RN  Activity Management: up ad juan  Taken 10/5/2023 0200 by Salazar Gomez RN  Activity Management: up ad juan  Taken 10/5/2023 0000 by Salazar Gomez RN  Activity Management: up ad juan  Sleep/Rest Enhancement:   awakenings minimized   noise level reduced   room darkened  Taken 10/4/2023 2200 by Salazar Gomez  RN  Activity Management: up ad juan  Sleep/Rest Enhancement:   awakenings minimized   family presence promoted   noise level reduced   room darkened  Taken 10/4/2023 2005 by Salazar Gomez RN  Activity Management: up ad juan  Sleep/Rest Enhancement:   awakenings minimized   family presence promoted   noise level reduced   regular sleep/rest pattern promoted   room darkened  Intervention: Promote Stabilization  Recent Flowsheet Documentation  Taken 10/4/2023 2200 by Salazar Gomez RN  Fever Reduction/Comfort Measures:   lightweight bedding   lightweight clothing  Taken 10/4/2023 2100 by Salazar Gomez RN  Fever Reduction/Comfort Measures:   lightweight bedding   lightweight clothing  Taken 10/4/2023 2005 by Salazar Gomez RN  Fever Reduction/Comfort Measures: (treated with tylenol) other (see comments)     Problem: Nutrition Impaired (Sepsis/Septic Shock)  Goal: Optimal Nutrition Intake  Outcome: Ongoing, Progressing

## 2023-10-05 NOTE — CONSULTS
INFECTIOUS DISEASE CONSULT/INITIAL HOSPITAL VISIT    Salam Strickland  1980  [id number]    Date of Consult: 10/5/2023    Admission Date: 10/3/2023      Requesting Provider: No ref. provider found  Evaluating Physician: Jimy Rand MD    Reason for Consultation: bacteremia    History of present illness:    Patient is a 43 y.o. female  [facility] for gram negative rods.    CT scan revealed  left ureteral stone with perinephritic stranding. Upon worsening nausea, vomiting, flank pain was admitted and taken to OR for for cystoscopy with left ureteral stent placement.    Feels better; on zosyn currently.    10/5/23; fevers down trending T dev 103.2  last ngiht;  resting quietly afebrile normotensive    History reviewed. No pertinent past medical history.    Past Surgical History:   Procedure Laterality Date    BACK SURGERY       SECTION      CYSTOSCOPY W/ URETERAL STENT PLACEMENT Left 10/3/2023    Procedure: CYSTOSCOPY URETERAL CATHETER/STENT INSERTION;  Surgeon: Jimmy Schroeder MD;  Location: [facility];  Service: Urology;  Laterality: Left;       Family History   Problem Relation Age of Onset    Heart disease Father        Social History     Socioeconomic History    Marital status: Single   Tobacco Use    Smoking status: Never    Smokeless tobacco: Never   Vaping Use    Vaping Use: Never used   Substance and Sexual Activity    Alcohol use: No    Drug use: No    Sexual activity: Defer       Allergies   Allergen Reactions    No Known Drug Allergy          Medication:    Current Facility-Administered Medications:     acetaminophen (TYLENOL) tablet 650 mg, 650 mg, Oral, Q4H PRN, Jose Moore PA-C, 650 mg at 10/04/23 2005    aspirin-acetaminophen-caffeine (EXCEDRIN MIGRAINE) 250-250-65 MG per tablet 2 tablet, 2 tablet, Oral, Q6H PRN, Melody Turner DO, 2 tablet at 10/05/23 1008    sennosides-docusate (PERICOLACE)  8.6-50 MG per tablet 2 tablet, 2 tablet, Oral, BID, 2 tablet at 10/05/23 0836 **AND** polyethylene glycol (MIRALAX) packet 17 g, 17 g, Oral, Daily PRN **AND** bisacodyl (DULCOLAX) EC tablet 5 mg, 5 mg, Oral, Daily PRN **AND** bisacodyl (DULCOLAX) suppository 10 mg, 10 mg, Rectal, Daily PRN, Jose Moore PA-POWER    cefTRIAXone (ROCEPHIN) 2000 mg/100 mL 0.9% NS IVPB (MBP), 2,000 mg, Intravenous, Q24H, Jimy Rand MD, 2,000 mg at 10/04/23 2006    lactated ringers infusion, 100 mL/hr, Intravenous, Continuous, Jose Moore PA-POWER, Last Rate: 100 mL/hr at 10/05/23 1634, 100 mL/hr at 10/05/23 1634    lactobacillus acidophilus (RISAQUAD) capsule 1 capsule, 1 capsule, Oral, Daily, Jose Moore PA-POWER, 1 capsule at 10/05/23 0835    melatonin tablet 5 mg, 5 mg, Oral, Nightly PRN, Jose Moore PA-POWER    morphine injection 2 mg, 2 mg, Intravenous, Q2H PRN, Jimmy Schroeder MD    ondansetron (ZOFRAN) injection 4 mg, 4 mg, Intravenous, Q6H PRN, Jose Moore PA-POWER, 4 mg at 10/05/23 1119    oxyCODONE-acetaminophen (PERCOCET) 5-325 MG per tablet 1 tablet, 1 tablet, Oral, Q6H PRN, Jimmy Schroeder MD, 1 tablet at 10/05/23 1110    sodium chloride 0.9 % flush 10 mL, 10 mL, Intravenous, PRN, Jimmy Schroeder MD    sodium chloride 0.9 % flush 10 mL, 10 mL, Intravenous, Q12H, Jose Moore PA-C, 10 mL at 10/04/23 2006    sodium chloride 0.9 % flush 10 mL, 10 mL, Intravenous, PRN, Jose Moore PA-C    sodium chloride 0.9 % infusion 40 mL, 40 mL, Intravenous, PRNOscar Bennicia L, PA-C    tamsulosin (FLOMAX) 24 hr capsule 0.4 mg, 0.4 mg, Oral, Daily, Jimmy Schroeder MD, 0.4 mg at 10/05/23 0836    Antibiotics:  Anti-Infectives (From admission, onward)      Ordered     Dose/Rate Route Frequency Start Stop    10/04/23 1731  cefTRIAXone (ROCEPHIN) 2000 mg/100 mL 0.9% NS IVPB (MBP)        Ordering Provider: Jimy Rand MD    2,000 mg  over 30 Minutes Intravenous Every 24 Hours  10/04/23 2100 10/11/23 2059    10/04/23 0151  vancomycin 1750 mg/500 mL 0.9% NS IVPB ([facility])        Ordering Provider: Merle Barfield RPH    20 mg/kg × 92.9 kg  over 105 Minutes Intravenous Once 10/04/23 0245 10/04/23 0447    10/04/23 0141  piperacillin-tazobactam (ZOSYN) 3.375 g in iso-osmotic dextrose 50 ml (premix)        Ordering Provider: Jose Moore PA-C    3.375 g  over 30 Minutes Intravenous Once 10/04/23 0230 10/04/23 0250    10/03/23 1947  cefTRIAXone (ROCEPHIN) 2000 mg/100 mL 0.9% NS IVPB (MBP)        Ordering Provider: Arpit Jackson MD    2,000 mg  over 30 Minutes Intravenous Once 10/03/23 2100 10/03/23 222              Review of Systems:  Remarkable for fever, chills, fatigue, nausea, vomiting, flank pain (left)      Physical Exam:   Vital Signs  Temp (24hrs), Av.5 °F (38.1 °C), Min:99.1 °F (37.3 °C), Max:103.2 °F (39.6 °C)    Temp  Min: 99.1 °F (37.3 °C)  Max: 103.2 °F (39.6 °C)  BP  Min: 112/67  Max: 161/74  Pulse  Min: 63  Max: 113  Resp  Min: 18  Max: 22  SpO2  Min: 91 %  Max: 98 %    GENERAL: in no acute distress.   HEENT: Normocephalic, atraumatic.  No ext oral lesions     LUNGS: symmetrical bilaterally   ABDOMEN:nondistended  EXT:  No edema  :  Without Merrill catheter.        Laboratory Data    Results from last 7 days   Lab Units 10/05/23  0430 10/04/23  0342 10/03/23  2119   WBC 10*3/mm3 8.99 15.06* 16.24*   HEMOGLOBIN g/dL 9.6* 10.6* 10.7*   HEMATOCRIT % 31.1* 33.6* 34.1   PLATELETS 10*3/mm3 222 297 287       Results from last 7 days   Lab Units 10/05/23  0430   SODIUM mmol/L 136   POTASSIUM mmol/L 3.6   CHLORIDE mmol/L 102   CO2 mmol/L 24.0   BUN mg/dL 12   CREATININE mg/dL 1.24*   GLUCOSE mg/dL 113*   CALCIUM mg/dL 7.9*       Results from last 7 days   Lab Units 10/03/23  2119   ALK PHOS U/L 76   BILIRUBIN mg/dL 0.6   ALT (SGPT) U/L 12   AST (SGOT) U/L 22               Results from last 7 days   Lab Units 10/03/23  2119   LACTATE mmol/L 1.4           Results from last 7  days   Lab Units 10/05/23  0430   VANCOMYCIN RM mcg/mL 36.90     Estimated Creatinine Clearance: 71.7 mL/min (A) (by C-G formula based on SCr of 1.24 mg/dL (H)).      Microbiology:  Blood Culture   Date Value Ref Range Status   10/03/2023 Abnormal Stain (C)  Preliminary     BCID, PCR   Date Value Ref Range Status   10/03/2023 Proteus spp. Identification by BCID2 PCR. (A) Negative by BCID PCR. Culture to Follow. Final     No results found for: CULTURES, HSVCX, URCX  No results found for: EYECULTURE, GCCX, HSVCULTURE, LABHSV  No results found for: LEGIONELLA, MRSACX, MUMPSCX, MYCOPLASCX  No results found for: NOCARDIACX, STOOLCX  No results found for: THROATCX, UNSTIMCULT, URINECX, CULTURE, VZVCULTUR  No results found for: VIRALCULTU, WOUNDCX        Radiology:  Imaging Results (Last 72 Hours)       Procedure Component Value Units Date/Time    CT Head Without Contrast [[id number]] Collected: 10/05/23 1216     Updated: 10/05/23 1232    Narrative:      CT HEAD WO CONTRAST    Date of Exam: 10/5/2023 12:07 PM EDT    Indication: Acute HA.    Comparison: None available.    Technique: Axial CT images were obtained of the head without contrast administration.  Automated exposure control and iterative construction methods were used.      Findings:  Parenchyma:No acute intraparenchymal hemorrhage. No loss of gray-white differentiation to suggest large territory infarct. Normal parenchymal volume. No substantial white matter disease. No midline shift or herniation.    Ventricles and extra axial spaces:Normal caliber of ventricles and sulci. No extra axial fluid collection seen.    Other:Orbits are grossly intact. Paranasal sinuses are clear. Mastoid air cells are clear. Calvarium is intact. No substantial intracranial atherosclerotic calcification.      Impression:      Impression:  No evidence of acute intracranial abnormality.        Electronically Signed: Michael Virk MD    10/5/2023 12:29 PM EDT    Workstation ID:  [id number]    FL C Arm During Surgery [[id number]] Resulted: 10/03/23 2233     Updated: 10/03/23 2233    Narrative:      This procedure was auto-finalized with no dictation required.              Impression:   Left ureteral stone with ureteral obstruction  Left pyelonephritis  Gnr bacteremia Proteus by pcr  Nausea/vomiting  Leukocytosis with neutrophilia    PLAN/RECOMMENDATIONS:   Thank you for asking us to see Salma Strickland, I recommend the following:  [employer] PCR is predicting Proteus species apparently resistant genes for negative although they are not listed how extensive this work-up is    Monitor for resolution of fevers x 24h    Cont rocephin 2 g iv daily    Discussed with family we will recommend 7-14 days of abx. (Proteus is susceptible to levofloxacin)    Upon further improment consider change to po levaquin 750 mg daily x 10-14 days                    Jimy Rand MD  10/5/2023  16:50 EDT

## 2023-10-06 LAB
ANION GAP SERPL CALCULATED.3IONS-SCNC: 7 MMOL/L (ref 5–15)
BACTERIA SPEC AEROBE CULT: ABNORMAL
BACTERIA SPEC AEROBE CULT: ABNORMAL
BASOPHILS # BLD AUTO: 0.02 10*3/MM3 (ref 0–0.2)
BASOPHILS NFR BLD AUTO: 0.3 % (ref 0–1.5)
BUN SERPL-MCNC: 9 MG/DL (ref 6–20)
BUN/CREAT SERPL: 10 (ref 7–25)
CALCIUM SPEC-SCNC: 8.5 MG/DL (ref 8.6–10.5)
CHLORIDE SERPL-SCNC: 102 MMOL/L (ref 98–107)
CO2 SERPL-SCNC: 24 MMOL/L (ref 22–29)
CREAT SERPL-MCNC: 0.9 MG/DL (ref 0.57–1)
DEPRECATED RDW RBC AUTO: 38.3 FL (ref 37–54)
EGFRCR SERPLBLD CKD-EPI 2021: 82 ML/MIN/1.73
EOSINOPHIL # BLD AUTO: 0.09 10*3/MM3 (ref 0–0.4)
EOSINOPHIL NFR BLD AUTO: 1.5 % (ref 0.3–6.2)
ERYTHROCYTE [DISTWIDTH] IN BLOOD BY AUTOMATED COUNT: 14.4 % (ref 12.3–15.4)
GLUCOSE SERPL-MCNC: 99 MG/DL (ref 65–99)
GRAM STN SPEC: ABNORMAL
HCT VFR BLD AUTO: 31.7 % (ref 34–46.6)
HGB BLD-MCNC: 9.9 G/DL (ref 12–15.9)
IMM GRANULOCYTES # BLD AUTO: 0.02 10*3/MM3 (ref 0–0.05)
IMM GRANULOCYTES NFR BLD AUTO: 0.3 % (ref 0–0.5)
ISOLATED FROM: ABNORMAL
LYMPHOCYTES # BLD AUTO: 1 10*3/MM3 (ref 0.7–3.1)
LYMPHOCYTES NFR BLD AUTO: 16.2 % (ref 19.6–45.3)
MCH RBC QN AUTO: 23 PG (ref 26.6–33)
MCHC RBC AUTO-ENTMCNC: 31.2 G/DL (ref 31.5–35.7)
MCV RBC AUTO: 73.5 FL (ref 79–97)
MONOCYTES # BLD AUTO: 0.39 10*3/MM3 (ref 0.1–0.9)
MONOCYTES NFR BLD AUTO: 6.3 % (ref 5–12)
NEUTROPHILS NFR BLD AUTO: 4.67 10*3/MM3 (ref 1.7–7)
NEUTROPHILS NFR BLD AUTO: 75.4 % (ref 42.7–76)
NRBC BLD AUTO-RTO: 0 /100 WBC (ref 0–0.2)
PLATELET # BLD AUTO: 216 10*3/MM3 (ref 140–450)
PMV BLD AUTO: 9.6 FL (ref 6–12)
POTASSIUM SERPL-SCNC: 3.5 MMOL/L (ref 3.5–5.2)
RBC # BLD AUTO: 4.31 10*6/MM3 (ref 3.77–5.28)
SODIUM SERPL-SCNC: 133 MMOL/L (ref 136–145)
WBC NRBC COR # BLD: 6.19 10*3/MM3 (ref 3.4–10.8)

## 2023-10-06 PROCEDURE — 85025 COMPLETE CBC W/AUTO DIFF WBC: CPT

## 2023-10-06 PROCEDURE — 80048 BASIC METABOLIC PNL TOTAL CA: CPT

## 2023-10-06 PROCEDURE — 99232 SBSQ HOSP IP/OBS MODERATE 35: CPT | Performed by: INTERNAL MEDICINE

## 2023-10-06 PROCEDURE — 25010000002 CEFTRIAXONE PER 250 MG: Performed by: INTERNAL MEDICINE

## 2023-10-06 PROCEDURE — 25010000002 MORPHINE PER 10 MG: Performed by: UROLOGY

## 2023-10-06 RX ADMIN — Medication 10 ML: at 08:31

## 2023-10-06 RX ADMIN — Medication 10 ML: at 20:35

## 2023-10-06 RX ADMIN — TAMSULOSIN HYDROCHLORIDE 0.4 MG: 0.4 CAPSULE ORAL at 08:31

## 2023-10-06 RX ADMIN — OXYCODONE HYDROCHLORIDE AND ACETAMINOPHEN 1 TABLET: 5; 325 TABLET ORAL at 01:04

## 2023-10-06 RX ADMIN — CEFTRIAXONE 2000 MG: 2 INJECTION, POWDER, FOR SOLUTION INTRAMUSCULAR; INTRAVENOUS at 20:34

## 2023-10-06 RX ADMIN — ACETAMINOPHEN 650 MG: 325 TABLET ORAL at 06:21

## 2023-10-06 RX ADMIN — STANDARDIZED SENNA CONCENTRATE AND DOCUSATE SODIUM 2 TABLET: 8.6; 5 TABLET, FILM COATED ORAL at 08:31

## 2023-10-06 RX ADMIN — STANDARDIZED SENNA CONCENTRATE AND DOCUSATE SODIUM 2 TABLET: 8.6; 5 TABLET, FILM COATED ORAL at 20:35

## 2023-10-06 RX ADMIN — OXYCODONE HYDROCHLORIDE AND ACETAMINOPHEN 1 TABLET: 5; 325 TABLET ORAL at 15:39

## 2023-10-06 RX ADMIN — Medication 1 CAPSULE: at 08:31

## 2023-10-06 RX ADMIN — MORPHINE SULFATE 2 MG: 2 INJECTION, SOLUTION INTRAMUSCULAR; INTRAVENOUS at 13:16

## 2023-10-06 RX ADMIN — ACETAMINOPHEN 650 MG: 325 TABLET ORAL at 17:08

## 2023-10-06 RX ADMIN — POLYETHYLENE GLYCOL 3350 17 G: 17 POWDER, FOR SOLUTION ORAL at 17:16

## 2023-10-06 RX ADMIN — OXYCODONE HYDROCHLORIDE AND ACETAMINOPHEN 1 TABLET: 5; 325 TABLET ORAL at 08:30

## 2023-10-06 RX ADMIN — OXYCODONE HYDROCHLORIDE AND ACETAMINOPHEN 1 TABLET: 5; 325 TABLET ORAL at 22:49

## 2023-10-06 NOTE — PROGRESS NOTES
"Urology    Patient Name: Salma Strickland  Medical Record Number: [id number]  YOB: 1980     LOS: 3 days   Patient Care Team:  Job Gamez MD as PCP - General (Internal Medicine)    Chief Complaint:    Chief Complaint   Patient presents with    Positive Blood Cultures       Subjective     Interval History:     Has had back pain and body aches today.  Tmax 100.8 this afternoon.  Voiding frequently.  Her mother is on the phone.    Review of Systems:    The following systems were reviewed and negative;  respiratory and cardiovascular    Objective     Vital Signs  /79   Pulse 83   Temp (!) 100.8 °F (38.2 °C) (Oral)   Resp 26   Ht 175.3 cm (69\")   Wt 92.9 kg (204 lb 14.4 oz)   LMP 09/28/2023 (Approximate)   SpO2 94%   BMI 30.26 kg/m²   I/O last 3 completed shifts:  In: 2638 [I.V.:2038; IV Piggyback:600]  Out: 3925 [Urine:3925]  I/O this shift:  In: 200 [P.O.:200]  Out: 450 [Urine:450]      Physical Exam:  General Appearance: No acute distress, sitting up in bed, pleasant, appears comfortable  Lungs: Respirations regular, even and  unlabored     Results Review:     I reviewed the patient's new clinical results.  Lab Results (last 24 hours)       Procedure Component Value Units Date/Time    Urine Culture - Urine, Urinary Bladder [[id number]]  (Abnormal)  (Susceptibility) Collected: 10/03/23 2228    Specimen: Urine from Urinary Bladder Updated: 10/06/23 1013     Urine Culture 50,000 CFU/mL Proteus mirabilis    Narrative:      Colonization of the urinary tract without infection is common. Treatment is discouraged unless the patient is symptomatic, pregnant, or undergoing an invasive urologic procedure.    Susceptibility        Proteus mirabilis      ARNOL      Ampicillin Susceptible      Ampicillin + Sulbactam Susceptible      Cefazolin Susceptible      Cefepime Susceptible      Ceftazidime Susceptible      Ceftriaxone Susceptible      Gentamicin Susceptible      Levofloxacin Susceptible      " Nitrofurantoin Resistant      Piperacillin + Tazobactam Susceptible      Trimethoprim + Sulfamethoxazole Susceptible                           Basic Metabolic Panel [[id number]]  (Abnormal) Collected: 10/06/23 0442    Specimen: Blood Updated: 10/06/23 0618     Glucose 99 mg/dL      BUN 9 mg/dL      Creatinine 0.90 mg/dL      Sodium 133 mmol/L      Potassium 3.5 mmol/L      Chloride 102 mmol/L      CO2 24.0 mmol/L      Calcium 8.5 mg/dL      BUN/Creatinine Ratio 10.0     Anion Gap 7.0 mmol/L      eGFR 82.0 mL/min/1.73     Narrative:      GFR Normal >60  Chronic Kidney Disease <60  Kidney Failure <15      Blood Culture - Blood, Hand, Left [[id number]]  (Abnormal) Collected: 10/03/23 2138    Specimen: Blood from Hand, Left Updated: 10/06/23 0608     Blood Culture Proteus mirabilis     Isolated from --     Gram Stain Anaerobic Bottle Gram negative bacilli    Narrative:      Refer to previous blood culture collected on 10/3/2023 0420 for MICs      CBC & Differential [[id number]]  (Abnormal) Collected: 10/06/23 0442    Specimen: Blood Updated: 10/06/23 0604    Narrative:      The following orders were created for panel order CBC & Differential.  Procedure                               Abnormality         Status                     ---------                               -----------         ------                     CBC Auto Differential[[id number]]        Abnormal            Final result               Scan Slide[[id number]]                                                                    Please view results for these tests on the individual orders.    CBC Auto Differential [[id number]]  (Abnormal) Collected: 10/06/23 0442    Specimen: Blood Updated: 10/06/23 0604     WBC 6.19 10*3/mm3      RBC 4.31 10*6/mm3      Hemoglobin 9.9 g/dL      Hematocrit 31.7 %      MCV 73.5 fL      MCH 23.0 pg      MCHC 31.2 g/dL      RDW 14.4 %      RDW-SD 38.3 fl      MPV 9.6 fL      Platelets 216 10*3/mm3      Neutrophil % 75.4 %       Lymphocyte % 16.2 %      Monocyte % 6.3 %      Eosinophil % 1.5 %      Basophil % 0.3 %      Immature Grans % 0.3 %      Neutrophils, Absolute 4.67 10*3/mm3      Lymphocytes, Absolute 1.00 10*3/mm3      Monocytes, Absolute 0.39 10*3/mm3      Eosinophils, Absolute 0.09 10*3/mm3      Basophils, Absolute 0.02 10*3/mm3      Immature Grans, Absolute 0.02 10*3/mm3      nRBC 0.0 /100 WBC             Medication Review:    Current Facility-Administered Medications:     acetaminophen (TYLENOL) tablet 650 mg, 650 mg, Oral, Q4H PRN, Jose Moore PA-C, 650 mg at 10/06/23 1708    aspirin-acetaminophen-caffeine (EXCEDRIN MIGRAINE) 250-250-65 MG per tablet 2 tablet, 2 tablet, Oral, Q6H PRN, Melody Turner, DO, 2 tablet at 10/05/23 1008    sennosides-docusate (PERICOLACE) 8.6-50 MG per tablet 2 tablet, 2 tablet, Oral, BID, 2 tablet at 10/06/23 0831 **AND** polyethylene glycol (MIRALAX) packet 17 g, 17 g, Oral, Daily PRN, 17 g at 10/06/23 1716 **AND** bisacodyl (DULCOLAX) EC tablet 5 mg, 5 mg, Oral, Daily PRN **AND** bisacodyl (DULCOLAX) suppository 10 mg, 10 mg, Rectal, Daily PRN, Jose Moore PA-C    cefTRIAXone (ROCEPHIN) 2000 mg/100 mL 0.9% NS IVPB (MBP), 2,000 mg, Intravenous, Q24H, Jimy Rand MD, 2,000 mg at 10/05/23 2034    lactobacillus acidophilus (RISAQUAD) capsule 1 capsule, 1 capsule, Oral, Daily, Jose Moore PA-C, 1 capsule at 10/06/23 0831    melatonin tablet 5 mg, 5 mg, Oral, Nightly PRN, Jose Moore PA-C    morphine injection 2 mg, 2 mg, Intravenous, Q2H PRN, Jimmy Schroeder MD, 2 mg at 10/06/23 1316    ondansetron (ZOFRAN) injection 4 mg, 4 mg, Intravenous, Q6H PRN, Jose Moore PA-C, 4 mg at 10/05/23 1119    oxyCODONE-acetaminophen (PERCOCET) 5-325 MG per tablet 1 tablet, 1 tablet, Oral, Q6H PRN, Jimmy Schroeder MD, 1 tablet at 10/06/23 1539    sodium chloride 0.9 % flush 10 mL, 10 mL, Intravenous, PRN, Jimmy Schroeder MD    sodium chloride 0.9 % flush 10 mL,  10 mL, Intravenous, Q12H, Jose Moore PA-POWER, 10 mL at 10/06/23 0831    sodium chloride 0.9 % flush 10 mL, 10 mL, Intravenous, PRN, Jose Moore PA-POWER    sodium chloride 0.9 % infusion 40 mL, 40 mL, Intravenous, PRN, Jose Moore PA-POWER    tamsulosin (FLOMAX) 24 hr capsule 0.4 mg, 0.4 mg, Oral, Daily, Jimmy Schroeder MD, 0.4 mg at 10/06/23 0831    Assessment and Plan    43-year-old female admitted 10/3/2023 with her first stone episode, sepsis, left pyelonephritis, bacteremia, and CHATO. A CT scan showed a 3 mm left proximal ureteral stone with delayed excretion and 2 mm left lower pole stone. The initial urinalysis was negative and the lactate was normal. Blood cultures and urine culture showed Proteus. She underwent cystoscopy with left stent placement 10/3/2023.  She started Rocephin, Zosyn, and vancomycin.     She feels better overall.  She has good urine output.  She has spiking fevers but trending down.  The leukocytosis and CHATO resolved.        Plan: Continue antibiotics per ID, currently Rocephin.  Continue tamsulosin.  Strain urine for stone.  I will arrange left ureteroscopy and stone extraction at a later date when medically stable      Sepsis    Nephrolithiasis    CHATO (acute kidney injury)    Anemia    Pyelonephritis    Bacteremia          Plan for disposition:Where: home and When:  TBD    Jimmy Schroeder MD  10/06/23  17:21 EDT

## 2023-10-06 NOTE — CASE MANAGEMENT/SOCIAL WORK
Continued Stay Note  [facility]     Patient Name: Salma Strickland  MRN: [id number]  Today's Date: 10/6/2023    Admit Date: 10/3/2023    Plan: Home at DC   Discharge Plan       Row Name 10/06/23 1330       Plan    Plan Home at DC    Plan Comments No new DC needs identified at this time. PO AB planned at DC at this time.    Final Discharge Disposition Code 01 - home or self-care                   Discharge Codes    No documentation.                 Expected Discharge Date and Time       Expected Discharge Date Expected Discharge Time    Oct 9, 2023               Violeta Roque RN    
Discharge Planning Assessment  [facility]     Patient Name: Salma Strickland  MRN: [id number]  Today's Date: 10/4/2023    Admit Date: 10/3/2023    Plan: Home at DC   Discharge Needs Assessment       Row Name 10/04/23 1544       Living Environment    People in Home child(mt), adult    Current Living Arrangements home    Living Arrangement Comments Lives with her son and daughter.       Discharge Needs Assessment    Equipment Currently Used at Home none    Equipment Needed After Discharge none    Discharge Coordination/Progress Denies current use of DME or outpt services.                   Discharge Plan       Row Name 10/04/23 1545       Plan    Plan Home at DC    Patient/Family in Agreement with Plan yes    Plan Comments I spoke with the pt. She denies any DC needs at this time.    Final Discharge Disposition Code 01 - home or self-care                  Continued Care and Services - Admitted Since 10/3/2023    Coordination has not been started for this encounter.       Expected Discharge Date and Time       Expected Discharge Date Expected Discharge Time    Oct 6, 2023            Demographic Summary    No documentation.                  Functional Status       Row Name 10/04/23 1544       Functional Status    Usual Activity Tolerance good       Functional Status, IADL    Medications independent    Meal Preparation independent    Housekeeping independent    Laundry independent    Shopping independent                   Psychosocial    No documentation.                  Abuse/Neglect    No documentation.                  Legal    No documentation.                  Substance Abuse    No documentation.                  Patient Forms    No documentation.                     Violeta Roque RN    
fair minus

## 2023-10-06 NOTE — PROGRESS NOTES
[facility] Medicine Services  PROGRESS NOTE    Patient Name: Salma Strickland  : 1980  MRN: [id number]    Date of Admission: 10/3/2023  Primary Care Physician: Job Gamez MD    Subjective   Subjective     CC:  Sepsis     HPI:  No acute overnight events. Feeling improved. HA improved. Some back pain. Constipation.       Objective   Objective     Vital Signs:   Temp:  [98.7 °F (37.1 °C)-99.9 °F (37.7 °C)] 99.5 °F (37.5 °C)  Heart Rate:  [] 101  Resp:  [18] 18  BP: (124-146)/(67-95) 139/80  Flow (L/min):  [1] 1     Physical Exam:  Constitutional: No acute distress, awake, alert  HENT: NCAT, mucous membranes moist  Respiratory: Clear to auscultation bilaterally, respiratory effort normal   Cardiovascular: RRR, no murmurs, rubs, or gallops  Gastrointestinal: Positive bowel sounds, soft, nontender, nondistended  Musculoskeletal: No bilateral ankle edema  Psychiatric: Appropriate affect, cooperative  Neurologic: Oriented x 3, strength symmetric in all extremities, Cranial Nerves grossly intact to confrontation, speech clear  Skin: No rashes      Results Reviewed:  LAB RESULTS:      Lab 10/06/23  0442 10/05/23  0430 10/04/23  0342 10/03/23  2119 10/03/23  0228   WBC 6.19 8.99 15.06* 16.24* 17.13*   HEMOGLOBIN 9.9* 9.6* 10.6* 10.7* 11.4*   HEMATOCRIT 31.7* 31.1* 33.6* 34.1 36.3   PLATELETS 216 222 297 287 356   NEUTROS ABS 4.67 7.61*  --  15.18* 15.30*   IMMATURE GRANS (ABS) 0.02 0.03  --  0.08* 0.06*   LYMPHS ABS 1.00 0.86  --  0.51* 0.77   MONOS ABS 0.39 0.46  --  0.45 0.96*   EOS ABS 0.09 0.01  --  0.00 0.02   MCV 73.5* 74.2* 74.3* 74.6* 75.5*   LACTATE  --   --   --  1.4 1.8         Lab 10/06/23  0442 10/05/23  0430 10/04/23  0342 10/03/23  2119 10/03/23  0420   SODIUM 133* 136 136 133* 136   POTASSIUM 3.5 3.6 3.8 3.8 4.1   CHLORIDE 102 102 102 99 101   CO2 24.0 24.0 20.0* 22.0 25.0   ANION GAP 7.0 10.0 14.0 12.0 10.0   BUN 9 12 17 18 13   CREATININE 0.90 1.24* 1.49* 1.73*  1.36*   EGFR 82.0 55.8* 44.8* 37.4* 50.0*   GLUCOSE 99 113* 130* 125* 129*   CALCIUM 8.5* 7.9* 8.1* 8.6 8.8   HEMOGLOBIN A1C  --   --   --  6.60*  --          Lab 10/03/23  2119 10/03/23  0420   TOTAL PROTEIN 6.7 7.5   ALBUMIN 3.8 3.9   GLOBULIN 2.9 3.6   ALT (SGPT) 12 9   AST (SGOT) 22 19   BILIRUBIN 0.6 0.5   ALK PHOS 76 82   LIPASE  --  31                 Lab 10/04/23  0342 10/03/23  2119   IRON 8*  --    IRON SATURATION (TSAT) 3*  --    TIBC 283*  --    TRANSFERRIN 190*  --    FERRITIN 211.90*  --    FOLATE  --  6.20   VITAMIN B 12  --  474         Brief Urine Lab Results  (Last result in the past 365 days)        Color   Clarity   Blood   Leuk Est   Nitrite   Protein   CREAT   Urine HCG        10/03/23 0219               Negative       10/03/23 0219 Yellow   Clear   Negative   Negative   Negative   Negative                   Microbiology Results Abnormal       None            CT Head Without Contrast    Result Date: 10/5/2023  CT HEAD WO CONTRAST Date of Exam: 10/5/2023 12:07 PM EDT Indication: Acute HA. Comparison: None available. Technique: Axial CT images were obtained of the head without contrast administration.  Automated exposure control and iterative construction methods were used. Findings: Parenchyma:No acute intraparenchymal hemorrhage. No loss of gray-white differentiation to suggest large territory infarct. Normal parenchymal volume. No substantial white matter disease. No midline shift or herniation. Ventricles and extra axial spaces:Normal caliber of ventricles and sulci. No extra axial fluid collection seen. Other:Orbits are grossly intact. Paranasal sinuses are clear. Mastoid air cells are clear. Calvarium is intact. No substantial intracranial atherosclerotic calcification.     Impression: Impression: No evidence of acute intracranial abnormality. Electronically Signed: Micheal Virk MD  10/5/2023 12:29 PM EDT  Workstation ID: [id number]         Current medications:  Scheduled  Meds:cefTRIAXone, 2,000 mg, Intravenous, Q24H  lactobacillus acidophilus, 1 capsule, Oral, Daily  senna-docusate sodium, 2 tablet, Oral, BID  sodium chloride, 10 mL, Intravenous, Q12H  tamsulosin, 0.4 mg, Oral, Daily      Continuous Infusions:   PRN Meds:.  acetaminophen    aspirin-acetaminophen-caffeine    senna-docusate sodium **AND** polyethylene glycol **AND** bisacodyl **AND** bisacodyl    melatonin    Morphine    ondansetron    oxyCODONE-acetaminophen    sodium chloride    sodium chloride    sodium chloride    Assessment & Plan   Assessment & Plan     Active Hospital Problems    Diagnosis  POA    **Sepsis [A41.9]  Yes    CHATO (acute kidney injury) [N17.9]  Yes    Anemia [D64.9]  Yes    Pyelonephritis [N12]  Yes    Bacteremia [R78.81]  Yes    Nephrolithiasis [N20.0]  Yes      Resolved Hospital Problems   No resolved problems to display.        Brief Hospital Course to date:  42 yo healthy female here with Sepsis, bacteremia 2/2 left pyelonephritis with obstructing left ureteral stone and hydronephrosis. S/P Fluoroscopy and cystoscopy with [place] ureteral [place] [place]     Sepsis Secondary to Pyelonephritis  Proteus Bacteremia POA  Left ureteral stone with hydronephrosis  - S/P Fluoroscopy and cystoscopy with left ureteral stent placement [place] [place]  -  Bcx 10/3 + proteus; Ucx with proteus   - Urology and ID consulted -- appreciate recs. Continue rocephin    - Urology planning outpatient left ureteroscopy and stone extraction at a later date when clinically improved   - WBC improved. Fever curve improving.     Constipation  - Continue bowel regimen. Decrease opiates as tolerated. Mobilize.      HA  - Likely related to the above. CT head negative/sinus clear. Trial excedrin   - improved      CHATO - resolved   - post-renal secondary to obstruction     Anemia  - baseline hemoglobin ~ 12.8  - start iron supplement     Expected Discharge Location and Transportation:  home  Expected Discharge   Expected Discharge Date: 10/9/2023; Expected Discharge Time:      DVT prophylaxis:  Mechanical DVT prophylaxis orders are present.     AM-PAC 6 Clicks Score (PT): 24 (10/05/23 8736)    CODE STATUS:   Code Status and Medical Interventions:   Ordered at: 10/03/23 2937     Level Of Support Discussed With:    Patient     Code Status (Patient has no pulse and is not breathing):    CPR (Attempt to Resuscitate)     Medical Interventions (Patient has pulse or is breathing):    Full Support       Melody Turner DO  10/06/23

## 2023-10-06 NOTE — PROGRESS NOTES
"Urology    Patient Name: Salma Strickland  Medical Record Number: [id number]  YOB: 1980     LOS: 2 days   Patient Care Team:  Job Gamez MD as PCP - General (Internal Medicine)    Chief Complaint:    Chief Complaint   Patient presents with    Positive Blood Cultures       Subjective     Interval History:     She feels better overall.  She had headache and low back pain earlier today but feels better currently.  She had high fever last evening.  She is voiding frequently.    Review of Systems:    The following systems were reviewed and negative;  respiratory and cardiovascular    Objective     Vital Signs  /67 (BP Location: Left arm, Patient Position: Lying)   Pulse 88   Temp 98.8 °F (37.1 °C) (Oral)   Resp 18   Ht 175.3 cm (69\")   Wt 92.9 kg (204 lb 14.4 oz)   LMP 09/28/2023 (Approximate)   SpO2 93%   BMI 30.26 kg/m²   I/O last 3 completed shifts:  In: 4760 [I.V.:3510; IV Piggyback:1250]  Out: 3375 [Urine:3375]  No intake/output data recorded.      Physical Exam:  General Appearance: No acute distress, sitting up in bed, pleasant, appears comfortable  Lungs: Respirations regular, even and  unlabored     Results Review:     I reviewed the patient's new clinical results.  Lab Results (last 24 hours)       Procedure Component Value Units Date/Time    Urine Culture - Urine, Urinary Bladder [[id number]]  (Abnormal) Collected: 10/03/23 2228    Specimen: Urine from Urinary Bladder Updated: 10/05/23 1315     Urine Culture 50,000 CFU/mL Proteus species    Narrative:      Colonization of the urinary tract without infection is common. Treatment is discouraged unless the patient is symptomatic, pregnant, or undergoing an invasive urologic procedure.    Blood Culture - Blood, Hand, Left [[id number]]  (Abnormal) Collected: 10/03/23 2138    Specimen: Blood from Hand, Left Updated: 10/05/23 1044     Gram Stain Anaerobic Bottle Gram negative bacilli    Blood Culture - Blood, Arm, Right [[id number]]  " (Abnormal) Collected: 10/03/23 2121    Specimen: Blood from Arm, Right Updated: 10/05/23 0616     Blood Culture Proteus mirabilis     Isolated from Aerobic and Anaerobic Bottles     Gram Stain Anaerobic Bottle Gram negative bacilli      Aerobic Bottle Gram negative bacilli    Narrative:      Refer to previous blood culture collected on 10/03/2023 0420 for MICs      Basic Metabolic Panel [[id number]]  (Abnormal) Collected: 10/05/23 0430    Specimen: Blood Updated: 10/05/23 0524     Glucose 113 mg/dL      BUN 12 mg/dL      Creatinine 1.24 mg/dL      Sodium 136 mmol/L      Potassium 3.6 mmol/L      Chloride 102 mmol/L      CO2 24.0 mmol/L      Calcium 7.9 mg/dL      BUN/Creatinine Ratio 9.7     Anion Gap 10.0 mmol/L      eGFR 55.8 mL/min/1.73     Narrative:      GFR Normal >60  Chronic Kidney Disease <60  Kidney Failure <15      Vancomycin, Random [[id number]]  (Normal) Collected: 10/05/23 0430    Specimen: Blood Updated: 10/05/23 0524     Vancomycin Random 36.90 mcg/mL     Narrative:      Therapeutic Ranges for Vancomycin    Vancomycin Random   5.0-40.0 mcg/mL  Vancomycin Trough   5.0-20.0 mcg/mL  Vancomycin Peak     20.0-40.0 mcg/mL    CBC & Differential [[id number]]  (Abnormal) Collected: 10/05/23 0430    Specimen: Blood Updated: 10/05/23 0511    Narrative:      The following orders were created for panel order CBC & Differential.  Procedure                               Abnormality         Status                     ---------                               -----------         ------                     CBC Auto Differential[[id number]]        Abnormal            Final result               Scan Slide[[id number]]                                                                    Please view results for these tests on the individual orders.    CBC Auto Differential [[id number]]  (Abnormal) Collected: 10/05/23 0430    Specimen: Blood Updated: 10/05/23 0511     WBC 8.99 10*3/mm3      RBC 4.19 10*6/mm3      Hemoglobin 9.6 g/dL       Hematocrit 31.1 %      MCV 74.2 fL      MCH 22.9 pg      MCHC 30.9 g/dL      RDW 14.6 %      RDW-SD 39.0 fl      MPV 9.5 fL      Platelets 222 10*3/mm3      Neutrophil % 84.7 %      Lymphocyte % 9.6 %      Monocyte % 5.1 %      Eosinophil % 0.1 %      Basophil % 0.2 %      Immature Grans % 0.3 %      Neutrophils, Absolute 7.61 10*3/mm3      Lymphocytes, Absolute 0.86 10*3/mm3      Monocytes, Absolute 0.46 10*3/mm3      Eosinophils, Absolute 0.01 10*3/mm3      Basophils, Absolute 0.02 10*3/mm3      Immature Grans, Absolute 0.03 10*3/mm3      nRBC 0.0 /100 WBC             Medication Review:    Current Facility-Administered Medications:     acetaminophen (TYLENOL) tablet 650 mg, 650 mg, Oral, Q4H PRN, Jose Moore PA-POWER, 650 mg at 10/05/23 1820    aspirin-acetaminophen-caffeine (EXCEDRIN MIGRAINE) 250-250-65 MG per tablet 2 tablet, 2 tablet, Oral, Q6H PRN, Melody Turner, DO, 2 tablet at 10/05/23 1008    sennosides-docusate (PERICOLACE) 8.6-50 MG per tablet 2 tablet, 2 tablet, Oral, BID, 2 tablet at 10/05/23 0836 **AND** polyethylene glycol (MIRALAX) packet 17 g, 17 g, Oral, Daily PRN **AND** bisacodyl (DULCOLAX) EC tablet 5 mg, 5 mg, Oral, Daily PRN **AND** bisacodyl (DULCOLAX) suppository 10 mg, 10 mg, Rectal, Daily PRN, Jose Moore PA-POWER    cefTRIAXone (ROCEPHIN) 2000 mg/100 mL 0.9% NS IVPB (MBP), 2,000 mg, Intravenous, Q24H, Jimy Rand MD, 2,000 mg at 10/04/23 2006    lactated ringers infusion, 100 mL/hr, Intravenous, Continuous, Jose Moore PA-C, Last Rate: 100 mL/hr at 10/05/23 1634, 100 mL/hr at 10/05/23 1634    lactobacillus acidophilus (RISAQUAD) capsule 1 capsule, 1 capsule, Oral, Daily, Jose Moore PA-C, 1 capsule at 10/05/23 0835    melatonin tablet 5 mg, 5 mg, Oral, Nightly PRN, Jose Moore PA-C    morphine injection 2 mg, 2 mg, Intravenous, Q2H PRN, Jimmy Schroeder MD    ondansetron (ZOFRAN) injection 4 mg, 4 mg, Intravenous, Q6H PRN, Jose Moore  PA-C, 4 mg at 10/05/23 1119    oxyCODONE-acetaminophen (PERCOCET) 5-325 MG per tablet 1 tablet, 1 tablet, Oral, Q6H PRN, Jimmy Schroeder MD, 1 tablet at 10/05/23 1820    sodium chloride 0.9 % flush 10 mL, 10 mL, Intravenous, PRN, Jimmy Schroeder MD    sodium chloride 0.9 % flush 10 mL, 10 mL, Intravenous, Q12H, Jose Moore, PA-C, 10 mL at 10/04/23 2006    sodium chloride 0.9 % flush 10 mL, 10 mL, Intravenous, PRN, Jose Moore PA-POWER    sodium chloride 0.9 % infusion 40 mL, 40 mL, Intravenous, PRN, Jose Moore PA-POWER    tamsulosin (FLOMAX) 24 hr capsule 0.4 mg, 0.4 mg, Oral, Daily, Jimmy Schroeder MD, 0.4 mg at 10/05/23 0836    Assessment and Plan    43-year-old female admitted 10/3/2023 with her first stone episode, sepsis, left pyelonephritis, bacteremia, and CHATO. A CT scan showed a 3 mm left proximal ureteral stone with delayed excretion and 2 mm left lower pole stone. The initial urinalysis was negative and the lactate was normal. Blood cultures and urine culture showed Proteus. She underwent cystoscopy with left stent placement 10/3/2023.  She started Rocephin, Zosyn, and vancomycin.     She feels better overall.  She has good urine output.  She has spiking fevers but trending down.  The WBC and creatinine improved.        Plan: Continue antibiotics per ID, currently Rocephin.  Continue tamsulosin.  Check final urine and blood cultures.  Strain urine for stone.  I will arrange left ureteroscopy and stone extraction at a later date when medically stable.      Sepsis    Nephrolithiasis    CHATO (acute kidney injury)    Anemia    Pyelonephritis    Bacteremia          Plan for disposition:Where: home and When:  TBD    Jimmy Schroeder MD  10/05/23  20:02 EDT

## 2023-10-07 LAB
ANION GAP SERPL CALCULATED.3IONS-SCNC: 9 MMOL/L (ref 5–15)
BASOPHILS # BLD AUTO: 0.03 10*3/MM3 (ref 0–0.2)
BASOPHILS NFR BLD AUTO: 0.4 % (ref 0–1.5)
BUN SERPL-MCNC: 8 MG/DL (ref 6–20)
BUN/CREAT SERPL: 8.5 (ref 7–25)
CALCIUM SPEC-SCNC: 8.6 MG/DL (ref 8.6–10.5)
CHLORIDE SERPL-SCNC: 99 MMOL/L (ref 98–107)
CO2 SERPL-SCNC: 27 MMOL/L (ref 22–29)
CREAT SERPL-MCNC: 0.94 MG/DL (ref 0.57–1)
DEPRECATED RDW RBC AUTO: 36.7 FL (ref 37–54)
EGFRCR SERPLBLD CKD-EPI 2021: 77.9 ML/MIN/1.73
EOSINOPHIL # BLD AUTO: 0.11 10*3/MM3 (ref 0–0.4)
EOSINOPHIL NFR BLD AUTO: 1.5 % (ref 0.3–6.2)
ERYTHROCYTE [DISTWIDTH] IN BLOOD BY AUTOMATED COUNT: 14 % (ref 12.3–15.4)
GLUCOSE BLDC GLUCOMTR-MCNC: 90 MG/DL (ref 70–130)
GLUCOSE SERPL-MCNC: 101 MG/DL (ref 65–99)
HCT VFR BLD AUTO: 31.5 % (ref 34–46.6)
HGB BLD-MCNC: 10.1 G/DL (ref 12–15.9)
IMM GRANULOCYTES # BLD AUTO: 0.03 10*3/MM3 (ref 0–0.05)
IMM GRANULOCYTES NFR BLD AUTO: 0.4 % (ref 0–0.5)
LYMPHOCYTES # BLD AUTO: 1.39 10*3/MM3 (ref 0.7–3.1)
LYMPHOCYTES NFR BLD AUTO: 19.2 % (ref 19.6–45.3)
MCH RBC QN AUTO: 23.1 PG (ref 26.6–33)
MCHC RBC AUTO-ENTMCNC: 32.1 G/DL (ref 31.5–35.7)
MCV RBC AUTO: 71.9 FL (ref 79–97)
MONOCYTES # BLD AUTO: 0.75 10*3/MM3 (ref 0.1–0.9)
MONOCYTES NFR BLD AUTO: 10.4 % (ref 5–12)
NEUTROPHILS NFR BLD AUTO: 4.93 10*3/MM3 (ref 1.7–7)
NEUTROPHILS NFR BLD AUTO: 68.1 % (ref 42.7–76)
NRBC BLD AUTO-RTO: 0 /100 WBC (ref 0–0.2)
PLATELET # BLD AUTO: 286 10*3/MM3 (ref 140–450)
PMV BLD AUTO: 9.3 FL (ref 6–12)
POTASSIUM SERPL-SCNC: 3.5 MMOL/L (ref 3.5–5.2)
RBC # BLD AUTO: 4.38 10*6/MM3 (ref 3.77–5.28)
SODIUM SERPL-SCNC: 135 MMOL/L (ref 136–145)
WBC NRBC COR # BLD: 7.24 10*3/MM3 (ref 3.4–10.8)

## 2023-10-07 PROCEDURE — 80048 BASIC METABOLIC PNL TOTAL CA: CPT

## 2023-10-07 PROCEDURE — 25010000002 CEFTRIAXONE PER 250 MG: Performed by: INTERNAL MEDICINE

## 2023-10-07 PROCEDURE — 25010000002 MORPHINE PER 10 MG: Performed by: UROLOGY

## 2023-10-07 PROCEDURE — 85025 COMPLETE CBC W/AUTO DIFF WBC: CPT

## 2023-10-07 PROCEDURE — 99232 SBSQ HOSP IP/OBS MODERATE 35: CPT | Performed by: NURSE PRACTITIONER

## 2023-10-07 PROCEDURE — 82948 REAGENT STRIP/BLOOD GLUCOSE: CPT

## 2023-10-07 RX ORDER — FERROUS SULFATE 325(65) MG
325 TABLET ORAL
Status: DISCONTINUED | OUTPATIENT
Start: 2023-10-08 | End: 2023-10-08 | Stop reason: HOSPADM

## 2023-10-07 RX ORDER — OXYCODONE HYDROCHLORIDE AND ACETAMINOPHEN 5; 325 MG/1; MG/1
1 TABLET ORAL EVERY 4 HOURS PRN
Status: DISCONTINUED | OUTPATIENT
Start: 2023-10-07 | End: 2023-10-08 | Stop reason: HOSPADM

## 2023-10-07 RX ORDER — ASCORBIC ACID 500 MG
500 TABLET ORAL DAILY
Status: DISCONTINUED | OUTPATIENT
Start: 2023-10-07 | End: 2023-10-08 | Stop reason: HOSPADM

## 2023-10-07 RX ADMIN — OXYCODONE HYDROCHLORIDE AND ACETAMINOPHEN 1 TABLET: 5; 325 TABLET ORAL at 20:34

## 2023-10-07 RX ADMIN — MORPHINE SULFATE 2 MG: 2 INJECTION, SOLUTION INTRAMUSCULAR; INTRAVENOUS at 08:25

## 2023-10-07 RX ADMIN — Medication 1 CAPSULE: at 08:25

## 2023-10-07 RX ADMIN — MORPHINE SULFATE 2 MG: 2 INJECTION, SOLUTION INTRAMUSCULAR; INTRAVENOUS at 11:44

## 2023-10-07 RX ADMIN — Medication 10 ML: at 08:25

## 2023-10-07 RX ADMIN — CEFTRIAXONE 2000 MG: 2 INJECTION, POWDER, FOR SOLUTION INTRAMUSCULAR; INTRAVENOUS at 20:34

## 2023-10-07 RX ADMIN — STANDARDIZED SENNA CONCENTRATE AND DOCUSATE SODIUM 2 TABLET: 8.6; 5 TABLET, FILM COATED ORAL at 20:34

## 2023-10-07 RX ADMIN — OXYCODONE HYDROCHLORIDE AND ACETAMINOPHEN 500 MG: 500 TABLET ORAL at 20:34

## 2023-10-07 RX ADMIN — STANDARDIZED SENNA CONCENTRATE AND DOCUSATE SODIUM 2 TABLET: 8.6; 5 TABLET, FILM COATED ORAL at 08:25

## 2023-10-07 RX ADMIN — TAMSULOSIN HYDROCHLORIDE 0.4 MG: 0.4 CAPSULE ORAL at 08:25

## 2023-10-07 RX ADMIN — Medication 10 ML: at 20:34

## 2023-10-07 NOTE — PROGRESS NOTES
INFECTIOUS DISEASE f/u     Salma Strickland  1980  [id number]    Date of Consult: 10/7/2023    Admission Date: 10/3/2023      Requesting Provider: No ref. provider found  Evaluating Physician: Jimy Rand MD    Reason for Consultation: bacteremia    History of present illness:    Patient is a 43 y.o. female  [facility] for gram negative rods.    CT scan revealed  left ureteral stone with perinephritic stranding. Upon worsening nausea, vomiting, flank pain was admitted and taken to OR for for cystoscopy with left ureteral stent placement.    Feels better; on zosyn currently.    10/5/23; fevers down trending T dev 103.2  last ngiht;  resting quietly afebrile normotensive    10/6/23; fevers better; has less back pain, no evnets overnight    History reviewed. No pertinent past medical history.    Past Surgical History:   Procedure Laterality Date    BACK SURGERY       SECTION      CYSTOSCOPY W/ URETERAL STENT PLACEMENT Left 10/3/2023    Procedure: CYSTOSCOPY URETERAL CATHETER/STENT INSERTION;  Surgeon: Jimmy Schroeder MD;  Location: [facility];  Service: Urology;  Laterality: Left;       Family History   Problem Relation Age of Onset    Heart disease Father        Social History     Socioeconomic History    Marital status: Single   Tobacco Use    Smoking status: Never    Smokeless tobacco: Never   Vaping Use    Vaping Use: Never used   Substance and Sexual Activity    Alcohol use: No    Drug use: No    Sexual activity: Defer       Allergies   Allergen Reactions    No Known Drug Allergy          Medication:    Current Facility-Administered Medications:     acetaminophen (TYLENOL) tablet 650 mg, 650 mg, Oral, Q4H PRN, Jose Moore PA-C, 650 mg at 10/06/23 1708    aspirin-acetaminophen-caffeine (EXCEDRIN MIGRAINE) 250-250-65 MG per tablet 2 tablet, 2 tablet, Oral, Q6H PRN, Melody Turner DO, 2 tablet at 10/05/23  1008    sennosides-docusate (PERICOLACE) 8.6-50 MG per tablet 2 tablet, 2 tablet, Oral, BID, 2 tablet at 10/07/23 0825 **AND** polyethylene glycol (MIRALAX) packet 17 g, 17 g, Oral, Daily PRN, 17 g at 10/06/23 1716 **AND** bisacodyl (DULCOLAX) EC tablet 5 mg, 5 mg, Oral, Daily PRN **AND** bisacodyl (DULCOLAX) suppository 10 mg, 10 mg, Rectal, Daily PRN, Jose Moore PA-POWER    cefTRIAXone (ROCEPHIN) 2000 mg/100 mL 0.9% NS IVPB (MBP), 2,000 mg, Intravenous, Q24H, Jimy Rand MD, 2,000 mg at 10/06/23 2034    lactobacillus acidophilus (RISAQUAD) capsule 1 capsule, 1 capsule, Oral, Daily, Jose Moore PA-C, 1 capsule at 10/07/23 0825    melatonin tablet 5 mg, 5 mg, Oral, Nightly PRN, Jose Moore PA-C    morphine injection 2 mg, 2 mg, Intravenous, Q2H PRN, Jimmy Schroeder MD, 2 mg at 10/07/23 0825    ondansetron (ZOFRAN) injection 4 mg, 4 mg, Intravenous, Q6H PRN, Jose Moore PA-C, 4 mg at 10/05/23 1119    oxyCODONE-acetaminophen (PERCOCET) 5-325 MG per tablet 1 tablet, 1 tablet, Oral, Q6H PRN, Jimmy Schroeder MD, 1 tablet at 10/06/23 2249    sodium chloride 0.9 % flush 10 mL, 10 mL, Intravenous, PRN, Jimmy Schroeder MD    sodium chloride 0.9 % flush 10 mL, 10 mL, Intravenous, Q12H, Jose Moore PA-C, 10 mL at 10/07/23 0825    sodium chloride 0.9 % flush 10 mL, 10 mL, Intravenous, PRN, Jose Moore PA-POWER    sodium chloride 0.9 % infusion 40 mL, 40 mL, Intravenous, PRN, Jose Moore, GINO    tamsulosin (FLOMAX) 24 hr capsule 0.4 mg, 0.4 mg, Oral, Daily, Jimmy Schroeder MD, 0.4 mg at 10/07/23 0825    Antibiotics:  Anti-Infectives (From admission, onward)      Ordered     Dose/Rate Route Frequency Start Stop    10/04/23 1731  cefTRIAXone (ROCEPHIN) 2000 mg/100 mL 0.9% NS IVPB (MBP)        Ordering Provider: Jimy Rand MD    2,000 mg  over 30 Minutes Intravenous Every 24 Hours 10/04/23 2100 10/11/23 2059    10/04/23 0151  vancomycin 1750 mg/500 mL  0.9% NS IVPB ([facility])        Ordering Provider: Merle Barfield RPH    20 mg/kg × 92.9 kg  over 105 Minutes Intravenous Once 10/04/23 0245 10/04/23 0447    10/04/23 0141  piperacillin-tazobactam (ZOSYN) 3.375 g in iso-osmotic dextrose 50 ml (premix)        Ordering Provider: Jose Moore PA-C    3.375 g  over 30 Minutes Intravenous Once 10/04/23 0230 10/04/23 0250    10/03/23 194  cefTRIAXone (ROCEPHIN) 2000 mg/100 mL 0.9% NS IVPB (MBP)        Ordering Provider: Arpit Jackson MD    2,000 mg  over 30 Minutes Intravenous Once 10/03/23 2100 10/03/23 2220              Review of Systems:  Remarkable for fever, chills, fatigue, nausea, vomiting, flank pain (left)      Physical Exam:   Vital Signs  Temp (24hrs), Av.5 °F (37.5 °C), Min:98.7 °F (37.1 °C), Max:100.8 °F (38.2 °C)    Temp  Min: 98.7 °F (37.1 °C)  Max: 100.8 °F (38.2 °C)  BP  Min: 134/75  Max: 152/78  Pulse  Min: 62  Max: 99  Resp  Min: 17  Max: 26  SpO2  Min: 90 %  Max: 97 %    GENERAL: in no acute distress.   HEENT: Normocephalic, atraumatic.  No ext oral lesions   CARD: s1s2 no m    LUNGS: symmetrical bilaterally   ABDOMEN:nondistended  EXT:  No edema  :  Without Merrill catheter.        Laboratory Data    Results from last 7 days   Lab Units 10/07/23  0433 10/06/23  0442 10/05/23  043   WBC 10*3/mm3 7.24 6.19 8.99   HEMOGLOBIN g/dL 10.1* 9.9* 9.6*   HEMATOCRIT % 31.5* 31.7* 31.1*   PLATELETS 10*3/mm3 286 216 222     Results from last 7 days   Lab Units 10/07/23  0433   SODIUM mmol/L 135*   POTASSIUM mmol/L 3.5   CHLORIDE mmol/L 99   CO2 mmol/L 27.0   BUN mg/dL 8   CREATININE mg/dL 0.94   GLUCOSE mg/dL 101*   CALCIUM mg/dL 8.6     Results from last 7 days   Lab Units 10/03/23  2119   ALK PHOS U/L 76   BILIRUBIN mg/dL 0.6   ALT (SGPT) U/L 12   AST (SGOT) U/L 22             Results from last 7 days   Lab Units 10/03/23  2119   LACTATE mmol/L 1.4         Results from last 7 days   Lab Units 10/05/23  0430   VANCOMYCIN RM mcg/mL 36.90     Estimated  Creatinine Clearance: 94.6 mL/min (by C-G formula based on SCr of 0.94 mg/dL).      Microbiology:  Blood Culture   Date Value Ref Range Status   10/03/2023 Abnormal Stain (C)  Preliminary     BCID, PCR   Date Value Ref Range Status   10/03/2023 Proteus spp. Identification by BCID2 PCR. (A) Negative by BCID PCR. Culture to Follow. Final     No results found for: CULTURES, HSVCX, URCX  No results found for: EYECULTURE, GCCX, HSVCULTURE, LABHSV  No results found for: LEGIONELLA, MRSACX, MUMPSCX, MYCOPLASCX  No results found for: NOCARDIACX, STOOLCX  No results found for: THROATCX, UNSTIMCULT, URINECX, CULTURE, VZVCULTUR  No results found for: VIRALCULTU, WOUNDCX        Radiology:  Imaging Results (Last 72 Hours)       Procedure Component Value Units Date/Time    CT Head Without Contrast [[id number]] Collected: 10/05/23 1216     Updated: 10/05/23 1232    Narrative:      CT HEAD WO CONTRAST    Date of Exam: 10/5/2023 12:07 PM EDT    Indication: Acute HA.    Comparison: None available.    Technique: Axial CT images were obtained of the head without contrast administration.  Automated exposure control and iterative construction methods were used.      Findings:  Parenchyma:No acute intraparenchymal hemorrhage. No loss of gray-white differentiation to suggest large territory infarct. Normal parenchymal volume. No substantial white matter disease. No midline shift or herniation.    Ventricles and extra axial spaces:Normal caliber of ventricles and sulci. No extra axial fluid collection seen.    Other:Orbits are grossly intact. Paranasal sinuses are clear. Mastoid air cells are clear. Calvarium is intact. No substantial intracranial atherosclerotic calcification.      Impression:      Impression:  No evidence of acute intracranial abnormality.        Electronically Signed: Michael Virk MD    10/5/2023 12:29 PM EDT    Workstation ID: [id number]              Impression:   Left ureteral stone with ureteral obstruction  Left  pyelonephritis  Gnr bacteremia Proteus by pcr  Nausea/vomiting  Leukocytosis with neutrophilia    PLAN/RECOMMENDATIONS:   Thank you for asking us to see Salma Strickland, I recommend the following:  [employer] PCR is predicting Proteus species apparently resistant genes for negative although they are not listed how extensive this work-up is    Monitor for resolution of fevers x 24h    Cont rocephin 2 g iv daily    Discussed with family we will recommend 7-14 days of abx. (Proteus is susceptible to levofloxacin)    Upon further improment consider change to po levaquin 750 mg daily x 10 days additional upon discharge    D/w family, clinical pharmacy    Ok to go home from my standpoint               Jimy Rand MD  10/7/2023  10:09 EDT

## 2023-10-07 NOTE — PLAN OF CARE
Problem: Adult Inpatient Plan of Care  Goal: Plan of Care Review  Outcome: Ongoing, Progressing  Flowsheets  Taken 10/7/2023 0516 by Alejandrina Griffin RN  Progress: improving  Outcome Evaluation: Pt. is A&Ox4. Up standby assist to BR. UO appropriate. No stones noted this shift. Pain controlled w/ PO meds per order. RA. VSS. NSR on tele.  Taken 10/5/2023 1518 by Parvin Valderrama RN  Plan of Care Reviewed With: patient  Goal: Absence of Hospital-Acquired Illness or Injury  Outcome: Ongoing, Progressing  Intervention: Identify and Manage Fall Risk  Recent Flowsheet Documentation  Taken 10/7/2023 0452 by Alejandrina Griffin RN  Safety Promotion/Fall Prevention:   activity supervised   assistive device/personal items within reach   clutter free environment maintained   elopement precautions   fall prevention program maintained   gait belt   nonskid shoes/slippers when out of bed   room organization consistent   safety round/check completed   toileting scheduled  Taken 10/7/2023 0238 by Alejandrina Griffin RN  Safety Promotion/Fall Prevention:   activity supervised   assistive device/personal items within reach   clutter free environment maintained   elopement precautions   fall prevention program maintained   gait belt   nonskid shoes/slippers when out of bed   room organization consistent   safety round/check completed   toileting scheduled  Taken 10/7/2023 0030 by Alejandrina Griffin RN  Safety Promotion/Fall Prevention:   activity supervised   assistive device/personal items within reach   clutter free environment maintained   elopement precautions   fall prevention program maintained   gait belt   nonskid shoes/slippers when out of bed   room organization consistent   safety round/check completed   toileting scheduled  Taken 10/6/2023 2249 by Alejandrina Griffin RN  Safety Promotion/Fall Prevention:   activity supervised   clutter free environment maintained   assistive device/personal items within reach   fall  prevention program maintained   elopement precautions   nonskid shoes/slippers when out of bed   room organization consistent   safety round/check completed   toileting scheduled  Taken 10/6/2023 2034 by Alejandrina Griffin RN  Safety Promotion/Fall Prevention:   activity supervised   assistive device/personal items within reach   clutter free environment maintained   elopement precautions   fall prevention program maintained   gait belt   nonskid shoes/slippers when out of bed   room organization consistent   toileting scheduled   safety round/check completed  Intervention: Prevent Skin Injury  Recent Flowsheet Documentation  Taken 10/7/2023 0452 by Alejandirna Griffin RN  Body Position: position changed independently  Taken 10/7/2023 0238 by Alejandrina Griffin RN  Body Position: position changed independently  Taken 10/7/2023 0030 by Alejandrina Griffin RN  Body Position: position changed independently  Taken 10/6/2023 2249 by Alejandrina Griffin RN  Body Position: position changed independently  Taken 10/6/2023 2034 by Alejandrina Griffin RN  Body Position: position changed independently  Intervention: Prevent and Manage VTE (Venous Thromboembolism) Risk  Recent Flowsheet Documentation  Taken 10/7/2023 0452 by Alejandrina Griffin RN  VTE Prevention/Management: patient refused intervention  Taken 10/7/2023 0030 by Alejandrina Griffin RN  Activity Management: ambulated to bathroom  Taken 10/6/2023 2034 by Alejandrina Griffin RN  Activity Management: ambulated to bathroom  VTE Prevention/Management: patient refused intervention  Intervention: Prevent Infection  Recent Flowsheet Documentation  Taken 10/7/2023 0452 by Alejandrina Griffin RN  Infection Prevention: rest/sleep promoted  Taken 10/7/2023 0238 by Alejandrina Griffin RN  Infection Prevention: rest/sleep promoted  Taken 10/7/2023 0030 by Alejandrina Griffin RN  Infection Prevention: rest/sleep promoted  Taken 10/6/2023 2034 by Alejandrina Griffin RN  Infection Prevention:    environmental surveillance performed   rest/sleep promoted  Goal: Optimal Comfort and Wellbeing  Outcome: Ongoing, Progressing  Intervention: Monitor Pain and Promote Comfort  Recent Flowsheet Documentation  Taken 10/6/2023 2249 by Alejandrina Griffin RN  Pain Management Interventions: see MAR  Intervention: Provide Person-Centered Care  Recent Flowsheet Documentation  Taken 10/6/2023 2034 by Alejandrina Griffin RN  Trust Relationship/Rapport:   care explained   thoughts/feelings acknowledged  Goal: Readiness for Transition of Care  Outcome: Ongoing, Progressing     Problem: Adjustment to Illness (Sepsis/Septic Shock)  Goal: Optimal Coping  Outcome: Ongoing, Progressing  Intervention: Optimize Psychosocial Adjustment to Illness  Recent Flowsheet Documentation  Taken 10/6/2023 2034 by Alejandrina Griffin RN  Supportive Measures:   active listening utilized   verbalization of feelings encouraged   relaxation techniques promoted   Goal Outcome Evaluation:           Progress: improving  Outcome Evaluation: Pt. is A&Ox4. Up standby assist to BR. UO appropriate. No stones noted this shift. Pain controlled w/ PO meds per order. RA. VSS. NSR on tele.

## 2023-10-07 NOTE — PROGRESS NOTES
[facility] Medicine Services  PROGRESS NOTE    Patient Name: Salma Strickland  : 1980  MRN: [id number]    Date of Admission: 10/3/2023  Primary Care Physician: Job Gamez MD    Subjective   Subjective     CC:  Sepsis     HPI:  Pt sitting up in bed with low grade fever yesterday. She is still reporting back pain and nausea. Will try to discontinue IV morphine today.       Objective   Objective     Vital Signs:   Temp:  [98 °F (36.7 °C)-100.8 °F (38.2 °C)] 98 °F (36.7 °C)  Heart Rate:  [62-99] 88  Resp:  [17-26] 17  BP: (134-152)/(75-98) 147/90     Physical Exam:  Constitutional: Awake, alert, NAD  HENT: NCAT, mucous membranes moist  Respiratory: Clear to auscultation bilaterally, nonlabored respirations   Cardiovascular: RRR, no murmurs, rubs, or gallops  Gastrointestinal: Positive bowel sounds, soft, nontender, nondistended  Musculoskeletal: No bilateral ankle edema  Psychiatric: Appropriate affect, cooperative  Neurologic: Oriented x 3, strength symmetric in all extremities, Cranial Nerves grossly intact to confrontation, speech clear  Skin: No rashes      Results Reviewed:  LAB RESULTS:      Lab 10/07/23  0433 10/06/23  0442 10/05/23  0430 10/04/23  0342 10/03/23  2119 10/03/23  0228   WBC 7.24 6.19 8.99 15.06* 16.24* 17.13*   HEMOGLOBIN 10.1* 9.9* 9.6* 10.6* 10.7* 11.4*   HEMATOCRIT 31.5* 31.7* 31.1* 33.6* 34.1 36.3   PLATELETS 286 216 222 297 287 356   NEUTROS ABS 4.93 4.67 7.61*  --  15.18* 15.30*   IMMATURE GRANS (ABS) 0.03 0.02 0.03  --  0.08* 0.06*   LYMPHS ABS 1.39 1.00 0.86  --  0.51* 0.77   MONOS ABS 0.75 0.39 0.46  --  0.45 0.96*   EOS ABS 0.11 0.09 0.01  --  0.00 0.02   MCV 71.9* 73.5* 74.2* 74.3* 74.6* 75.5*   LACTATE  --   --   --   --  1.4 1.8         Lab 10/07/23  0433 10/06/23  0442 10/05/23  0430 10/04/23  0342 10/03/23  2119   SODIUM 135* 133* 136 136 133*   POTASSIUM 3.5 3.5 3.6 3.8 3.8   CHLORIDE 99 102 102 102 99   CO2 27.0 24.0 24.0 20.0* 22.0   ANION GAP  9.0 7.0 10.0 14.0 12.0   BUN 8 9 12 17 18   CREATININE 0.94 0.90 1.24* 1.49* 1.73*   EGFR 77.9 82.0 55.8* 44.8* 37.4*   GLUCOSE 101* 99 113* 130* 125*   CALCIUM 8.6 8.5* 7.9* 8.1* 8.6   HEMOGLOBIN A1C  --   --   --   --  6.60*         Lab 10/03/23  2119 10/03/23  0420   TOTAL PROTEIN 6.7 7.5   ALBUMIN 3.8 3.9   GLOBULIN 2.9 3.6   ALT (SGPT) 12 9   AST (SGOT) 22 19   BILIRUBIN 0.6 0.5   ALK PHOS 76 82   LIPASE  --  31                 Lab 10/04/23  0342 10/03/23  2119   IRON 8*  --    IRON SATURATION (TSAT) 3*  --    TIBC 283*  --    TRANSFERRIN 190*  --    FERRITIN 211.90*  --    FOLATE  --  6.20   VITAMIN B 12  --  474         Brief Urine Lab Results  (Last result in the past 365 days)        Color   Clarity   Blood   Leuk Est   Nitrite   Protein   CREAT   Urine HCG        10/03/23 0219               Negative       10/03/23 0219 Yellow   Clear   Negative   Negative   Negative   Negative                   Microbiology Results Abnormal       None            No radiology results from the last 24 hrs        Current medications:  Scheduled Meds:cefTRIAXone, 2,000 mg, Intravenous, Q24H  lactobacillus acidophilus, 1 capsule, Oral, Daily  senna-docusate sodium, 2 tablet, Oral, BID  sodium chloride, 10 mL, Intravenous, Q12H  tamsulosin, 0.4 mg, Oral, Daily      Continuous Infusions:   PRN Meds:.  acetaminophen    aspirin-acetaminophen-caffeine    senna-docusate sodium **AND** polyethylene glycol **AND** bisacodyl **AND** bisacodyl    melatonin    Morphine    ondansetron    oxyCODONE-acetaminophen    sodium chloride    sodium chloride    sodium chloride    Assessment & Plan   Assessment & Plan     Active Hospital Problems    Diagnosis  POA    **Sepsis [A41.9]  Yes    CHATO (acute kidney injury) [N17.9]  Yes    Anemia [D64.9]  Yes    Pyelonephritis [N12]  Yes    Bacteremia [R78.81]  Yes    Nephrolithiasis [N20.0]  Yes      Resolved Hospital Problems   No resolved problems to display.     Brief Hospital Course to date:  Salma  JAQUELINE Strickland is a 42 yo healthy female here with Sepsis, bacteremia 2/2 left pyelonephritis with obstructing left ureteral stone and hydronephrosis. S/P Fluoroscopy and cystoscopy with left ureteral stent placement per urology ([facility] [place].     Sepsis Secondary to Pyelonephritis  Proteus Bacteremia POA  Left ureteral stone with hydronephrosis  - S/P Fluoroscopy and cystoscopy with left ureteral stent placement per urology ([facility] [place]), 10/3  -  Bcx 10/3 + proteus; Ucx with proteus   - Urology and ID consulted -- appreciate recs. Continue rocephin    - Urology planning outpatient left ureteroscopy and stone extraction at a later date when clinically improved   - WBC now normal. Fever curve improving.     Constipation  - Continue bowel regimen. Decrease opiates as tolerated. Mobilize.      HA  - Likely related to the above. CT head negative/sinus clear. Trial excedrin   - improved      CHATO - resolved   - post-renal secondary to obstruction     Anemia  - baseline hemoglobin ~ 12.8  - start iron supplement     Expected Discharge Location and Transportation: home  Expected Discharge   10/08/2023    Copied text in this note has been reviewed and is accurate as of 10/07/23.       DVT prophylaxis:  Mechanical DVT prophylaxis orders are present.     AM-PAC 6 Clicks Score (PT): 24 (10/07/23 0866)    CODE STATUS:   Code Status and Medical Interventions:   Ordered at: 10/03/23 9342     Level Of Support Discussed With:    Patient     Code Status (Patient has no pulse and is not breathing):    CPR (Attempt to Resuscitate)     Medical Interventions (Patient has pulse or is breathing):    Full Support       Aleah Fritz, LEON  10/07/23

## 2023-10-08 ENCOUNTER — READMISSION MANAGEMENT (OUTPATIENT)
Dept: CALL CENTER | Facility: HOSPITAL | Age: 43
End: 2023-10-08
Payer: MEDICAID

## 2023-10-08 VITALS
HEART RATE: 60 BPM | SYSTOLIC BLOOD PRESSURE: 140 MMHG | HEIGHT: 69 IN | RESPIRATION RATE: 14 BRPM | BODY MASS INDEX: 30.35 KG/M2 | WEIGHT: 204.9 LBS | DIASTOLIC BLOOD PRESSURE: 76 MMHG | OXYGEN SATURATION: 96 % | TEMPERATURE: 98.4 F

## 2023-10-08 PROBLEM — N12 PYELONEPHRITIS: Status: RESOLVED | Noted: 2023-10-04 | Resolved: 2023-10-08

## 2023-10-08 PROBLEM — N17.9 AKI (ACUTE KIDNEY INJURY): Status: RESOLVED | Noted: 2023-10-04 | Resolved: 2023-10-08

## 2023-10-08 PROBLEM — A41.9 SEPSIS: Status: RESOLVED | Noted: 2023-10-04 | Resolved: 2023-10-08

## 2023-10-08 PROBLEM — R78.81 BACTEREMIA: Status: RESOLVED | Noted: 2023-10-04 | Resolved: 2023-10-08

## 2023-10-08 LAB
ANION GAP SERPL CALCULATED.3IONS-SCNC: 8 MMOL/L (ref 5–15)
BASOPHILS # BLD AUTO: 0.04 10*3/MM3 (ref 0–0.2)
BASOPHILS NFR BLD AUTO: 0.4 % (ref 0–1.5)
BUN SERPL-MCNC: 10 MG/DL (ref 6–20)
BUN/CREAT SERPL: 10.6 (ref 7–25)
CALCIUM SPEC-SCNC: 8.7 MG/DL (ref 8.6–10.5)
CHLORIDE SERPL-SCNC: 102 MMOL/L (ref 98–107)
CO2 SERPL-SCNC: 29 MMOL/L (ref 22–29)
CREAT SERPL-MCNC: 0.94 MG/DL (ref 0.57–1)
DEPRECATED RDW RBC AUTO: 34.8 FL (ref 37–54)
EGFRCR SERPLBLD CKD-EPI 2021: 77.9 ML/MIN/1.73
EOSINOPHIL # BLD AUTO: 0.23 10*3/MM3 (ref 0–0.4)
EOSINOPHIL NFR BLD AUTO: 2.5 % (ref 0.3–6.2)
ERYTHROCYTE [DISTWIDTH] IN BLOOD BY AUTOMATED COUNT: 13.7 % (ref 12.3–15.4)
GLUCOSE SERPL-MCNC: 92 MG/DL (ref 65–99)
HCT VFR BLD AUTO: 31.3 % (ref 34–46.6)
HGB BLD-MCNC: 10.2 G/DL (ref 12–15.9)
IMM GRANULOCYTES # BLD AUTO: 0.07 10*3/MM3 (ref 0–0.05)
IMM GRANULOCYTES NFR BLD AUTO: 0.8 % (ref 0–0.5)
LYMPHOCYTES # BLD AUTO: 1.56 10*3/MM3 (ref 0.7–3.1)
LYMPHOCYTES NFR BLD AUTO: 17 % (ref 19.6–45.3)
MCH RBC QN AUTO: 23 PG (ref 26.6–33)
MCHC RBC AUTO-ENTMCNC: 32.6 G/DL (ref 31.5–35.7)
MCV RBC AUTO: 70.7 FL (ref 79–97)
MONOCYTES # BLD AUTO: 1.08 10*3/MM3 (ref 0.1–0.9)
MONOCYTES NFR BLD AUTO: 11.8 % (ref 5–12)
NEUTROPHILS NFR BLD AUTO: 6.21 10*3/MM3 (ref 1.7–7)
NEUTROPHILS NFR BLD AUTO: 67.5 % (ref 42.7–76)
NRBC BLD AUTO-RTO: 0 /100 WBC (ref 0–0.2)
PLATELET # BLD AUTO: 318 10*3/MM3 (ref 140–450)
PMV BLD AUTO: 9.5 FL (ref 6–12)
POTASSIUM SERPL-SCNC: 3.7 MMOL/L (ref 3.5–5.2)
RBC # BLD AUTO: 4.43 10*6/MM3 (ref 3.77–5.28)
SODIUM SERPL-SCNC: 139 MMOL/L (ref 136–145)
WBC NRBC COR # BLD: 9.19 10*3/MM3 (ref 3.4–10.8)

## 2023-10-08 PROCEDURE — 80048 BASIC METABOLIC PNL TOTAL CA: CPT

## 2023-10-08 PROCEDURE — 85025 COMPLETE CBC W/AUTO DIFF WBC: CPT

## 2023-10-08 PROCEDURE — 99239 HOSP IP/OBS DSCHRG MGMT >30: CPT | Performed by: NURSE PRACTITIONER

## 2023-10-08 RX ORDER — OXYCODONE HYDROCHLORIDE AND ACETAMINOPHEN 5; 325 MG/1; MG/1
1 TABLET ORAL EVERY 6 HOURS PRN
Qty: 12 TABLET | Refills: 0 | Status: SHIPPED | OUTPATIENT
Start: 2023-10-08 | End: 2023-10-11

## 2023-10-08 RX ORDER — FERROUS SULFATE 325(65) MG
325 TABLET ORAL
Qty: 30 TABLET | Refills: 0 | Status: SHIPPED | OUTPATIENT
Start: 2023-10-08

## 2023-10-08 RX ORDER — CEFDINIR 300 MG/1
300 CAPSULE ORAL 2 TIMES DAILY
Qty: 6 CAPSULE | Refills: 0 | Status: SHIPPED | OUTPATIENT
Start: 2023-10-08 | End: 2023-10-08 | Stop reason: HOSPADM

## 2023-10-08 RX ORDER — ACETAMINOPHEN 325 MG/1
650 TABLET ORAL EVERY 6 HOURS PRN
Start: 2023-10-08

## 2023-10-08 RX ORDER — LEVOFLOXACIN 750 MG/1
750 TABLET ORAL DAILY
Qty: 10 TABLET | Refills: 0 | Status: SHIPPED | OUTPATIENT
Start: 2023-10-08 | End: 2023-10-18

## 2023-10-08 RX ORDER — TAMSULOSIN HYDROCHLORIDE 0.4 MG/1
0.4 CAPSULE ORAL DAILY
Qty: 30 CAPSULE | Refills: 0 | Status: SHIPPED | OUTPATIENT
Start: 2023-10-08

## 2023-10-08 RX ORDER — L.ACID,PARA/B.BIFIDUM/S.THERM 8B CELL
1 CAPSULE ORAL DAILY
Qty: 30 CAPSULE | Refills: 0 | Status: SHIPPED | OUTPATIENT
Start: 2023-10-08

## 2023-10-08 RX ORDER — ASCORBIC ACID 500 MG
500 TABLET ORAL DAILY
Qty: 30 TABLET | Refills: 0 | Status: SHIPPED | OUTPATIENT
Start: 2023-10-08

## 2023-10-08 RX ADMIN — TAMSULOSIN HYDROCHLORIDE 0.4 MG: 0.4 CAPSULE ORAL at 08:39

## 2023-10-08 RX ADMIN — OXYCODONE HYDROCHLORIDE AND ACETAMINOPHEN 500 MG: 500 TABLET ORAL at 08:39

## 2023-10-08 RX ADMIN — FERROUS SULFATE TAB 325 MG (65 MG ELEMENTAL FE) 325 MG: 325 (65 FE) TAB at 08:39

## 2023-10-08 RX ADMIN — Medication 10 ML: at 08:42

## 2023-10-08 RX ADMIN — ACETAMINOPHEN 650 MG: 325 TABLET ORAL at 13:28

## 2023-10-08 RX ADMIN — Medication 1 CAPSULE: at 08:39

## 2023-10-08 RX ADMIN — OXYCODONE HYDROCHLORIDE AND ACETAMINOPHEN 1 TABLET: 5; 325 TABLET ORAL at 02:17

## 2023-10-08 NOTE — OUTREACH NOTE
Prep Survey      Flowsheet Row Responses   [facility] patient discharged from? [place]   Is LACE score < 7 ? Yes   Eligibility [facility]   Date of Admission 10/03/23   Date of Discharge 10/08/23   Discharge Disposition Home or Self Care   Discharge diagnosis **Sepsis (A   Does the patient have one of the following disease processes/diagnoses(primary or secondary)? Sepsis   Does the patient have Home health ordered? No   Is there a DME ordered? No   Prep survey completed? Yes            TALIB WADDELL - Registered Nurse

## 2023-10-08 NOTE — DISCHARGE SUMMARY
[facility] Medicine Services  DISCHARGE SUMMARY    Patient Name: Salma Strickland  : 1980  MRN: [id number]    Date of Admission: 10/3/2023  Date of Discharge:  10/08/2023  Primary Care Physician: Job Gamez MD    Consults       Date and Time Order Name Status Description    10/4/2023  6:35 AM Inpatient Infectious Diseases Consult Completed             Hospital Course     Presenting Problem:   Nephrolithiasis [N20.0]    Active Hospital Problems    Diagnosis  POA    Anemia [D64.9]  Yes    Nephrolithiasis [N20.0]  Yes      Resolved Hospital Problems    Diagnosis Date Resolved POA    **Sepsis [A41.9] 10/08/2023 Yes    CHATO (acute kidney injury) [N17.9] 10/08/2023 Yes    Pyelonephritis [N12] 10/08/2023 Yes    Bacteremia [R78.81] 10/08/2023 Yes      Hospital Course:  Salma Strickland is a 43 y.o. female healthy female presenting with sepsis, bacteremia secondary to left pyelonephritis with obstructing left ureteral stone and hydronephrosis.  S/p uroscopy and cystoscopy with left ureteral stent placement per urology on 10/3/23. Blood cultures and urine culture positive for Proteus. ID consulted recommending ceftriaxone, transition to levaquin 750 mg daily for 10 days at discharge.       Discharge Follow Up Recommendations for labs/diagnostics:  Follow-up with PCP in 1 week  Follow-up with  in 1 week    Day of Discharge     HPI:  Patient resting in bed, reporting mild back pain.  Denies nausea this morning.  She has been transitioned to oral pain medication.  Has remained afebrile for 24 hours.    Review of Systems  Gen- No fevers, chills  CV- No chest pain, palpitations  Resp- No cough, dyspnea  GI- No N/V/D, abd pain  MS- (+) low back pain    Otherwise ROS is negative except as mentioned in the HPI.    Vital Signs:   Temp:  [97.8 °F (36.6 °C)-99.9 °F (37.7 °C)] 98.4 °F (36.9 °C)  Heart Rate:  [58-88] 60  Resp:  [14-17] 14  BP: (127-147)/(70-90) 140/76     Physical  Exam:  Constitutional: Awake, alert, NAD  HENT: NCAT, mucous membranes moist  Respiratory: Clear to auscultation bilaterally, nonlabored respirations   Cardiovascular: RRR, no murmurs, rubs, or gallops  Gastrointestinal: Positive bowel sounds, soft, nontender, nondistended  Musculoskeletal: No bilateral ankle edema  Psychiatric: Appropriate affect, cooperative  Neurologic: Oriented x 3, strength symmetric in all extremities, Cranial Nerves grossly intact to confrontation, speech clear  Skin: No rashes    Plan:  Sepsis secondary to pyelonephritis  Proteus bacteremia POA/urine culture positive for Proteus  Left ureteral stone with hydronephrosis  - 10/6 s/p fluoroscopy and cystoscopy with a left ureteral stent placement per urology (Dr Schroeder).  Patient received ceftriaxone transition to Levaquin at discharge per ID recommendations.    Constipation  - Encourage mobilization, increase water intake, bowel regiment.    Headache  - Clear related to above, CT head negative, sinus clear.  Improved with Excedrin    AKA-resolved  - Post-renal secondary to obstruction    Anemia, iron deficiency  - Baseline hemoglobin 12.8.  Started iron supplementation      Pertinent  and/or Most Recent Results     Results from last 7 days   Lab Units 10/08/23  0350 10/07/23  0433 10/06/23  0442 10/05/23  0430 10/04/23  0342 10/03/23  2119 10/03/23  0420 10/03/23  0228   WBC 10*3/mm3 9.19 7.24 6.19 8.99 15.06* 16.24*  --  17.13*   HEMOGLOBIN g/dL 10.2* 10.1* 9.9* 9.6* 10.6* 10.7*  --  11.4*   HEMATOCRIT % 31.3* 31.5* 31.7* 31.1* 33.6* 34.1  --  36.3   PLATELETS 10*3/mm3 318 286 216 222 297 287  --  356   SODIUM mmol/L 139 135* 133* 136 136 133* 136  --    POTASSIUM mmol/L 3.7 3.5 3.5 3.6 3.8 3.8 4.1  --    CHLORIDE mmol/L 102 99 102 102 102 99 101  --    CO2 mmol/L 29.0 27.0 24.0 24.0 20.0* 22.0 25.0  --    BUN mg/dL 10 8 9 12 17 18 13  --    CREATININE mg/dL 0.94 0.94 0.90 1.24* 1.49* 1.73* 1.36*  --    GLUCOSE mg/dL 92 101* 99 113* 130* 125*  "129*  --    CALCIUM mg/dL 8.7 8.6 8.5* 7.9* 8.1* 8.6 8.8  --      Results from last 7 days   Lab Units 10/03/23  2119 10/03/23  0420   BILIRUBIN mg/dL 0.6 0.5   ALK PHOS U/L 76 82   ALT (SGPT) U/L 12 9   AST (SGOT) U/L 22 19           Invalid input(s): \"TG\", \"LDLCALC\", \"LDLREALC\"  Results from last 7 days   Lab Units 10/03/23  2119 10/03/23  0228   HEMOGLOBIN A1C % 6.60*  --    LACTATE mmol/L 1.4 1.8       Brief Urine Lab Results  (Last result in the past 365 days)        Color   Clarity   Blood   Leuk Est   Nitrite   Protein   CREAT   Urine HCG        10/03/23 0219               Negative       10/03/23 0219 Yellow   Clear   Negative   Negative   Negative   Negative                   Microbiology Results Abnormal       None            Imaging Results (All)       Procedure Component Value Units Date/Time    CT Head Without Contrast [[id number]] Collected: 10/05/23 1216     Updated: 10/05/23 1232    Narrative:      CT HEAD WO CONTRAST    Date of Exam: 10/5/2023 12:07 PM EDT    Indication: Acute HA.    Comparison: None available.    Technique: Axial CT images were obtained of the head without contrast administration.  Automated exposure control and iterative construction methods were used.      Findings:  Parenchyma:No acute intraparenchymal hemorrhage. No loss of gray-white differentiation to suggest large territory infarct. Normal parenchymal volume. No substantial white matter disease. No midline shift or herniation.    Ventricles and extra axial spaces:Normal caliber of ventricles and sulci. No extra axial fluid collection seen.    Other:Orbits are grossly intact. Paranasal sinuses are clear. Mastoid air cells are clear. Calvarium is intact. No substantial intracranial atherosclerotic calcification.      Impression:      Impression:  No evidence of acute intracranial abnormality.        Electronically Signed: Michael Virk MD    10/5/2023 12:29 PM EDT    Workstation ID: [id number]    FL C Arm During Surgery " [[id number]] Resulted: 10/03/23 2233     Updated: 10/03/23 2233    Narrative:      This procedure was auto-finalized with no dictation required.                          Discharge Details        Discharge Medications        New Medications        Instructions Start Date   acetaminophen 325 MG tablet  Commonly known as: TYLENOL   650 mg, Oral, Every 6 Hours PRN      ascorbic acid 500 MG tablet  Commonly known as: VITAMIN C   500 mg, Oral, Daily      cefdinir 300 MG capsule  Commonly known as: OMNICEF   300 mg, Oral, 2 Times Daily      ferrous sulfate 325 (65 FE) MG tablet   325 mg, Oral, Daily With Breakfast      lactobacillus acidophilus capsule capsule   1 capsule, Oral, Daily      oxyCODONE-acetaminophen 5-325 MG per tablet  Commonly known as: PERCOCET   1 tablet, Oral, Every 6 Hours PRN             Continue These Medications        Instructions Start Date   ondansetron ODT 4 MG disintegrating tablet  Commonly known as: ZOFRAN-ODT   4 mg, Translingual, Every 6 Hours PRN      tamsulosin 0.4 MG capsule 24 hr capsule  Commonly known as: FLOMAX   0.4 mg, Oral, Daily             Stop These Medications      oxyCODONE 5 MG immediate release tablet  Commonly known as: Roxicodone              Allergies   Allergen Reactions    No Known Drug Allergy          Discharge Disposition:  Home or Self Care    Discharge Diet:  Diet Order   Procedures    Diet: Regular/House Diet; Texture: Regular Texture (IDDSI 7); Fluid Consistency: Thin (IDDSI 0)         Discharge Activity:   Activity Instructions       Activity as Tolerated      Driving Restrictions      Type of Restriction: Driving    Driving Restrictions: No Driving While Taking Narcotics    Up WIth Assist                CODE STATUS:    Code Status and Medical Interventions:   Ordered at: 10/03/23 2333     Level Of Support Discussed With:    Patient     Code Status (Patient has no pulse and is not breathing):    CPR (Attempt to Resuscitate)     Medical Interventions (Patient has  pulse or is breathing):    Full Support         No future appointments.    Additional Instructions for the Follow-ups that You Need to Schedule       Call MD With Problems / Concerns   As directed      Instructions: Call provider for temperature greater than 100.4, nausea, vomiting, diarrhea, chest pain, heart palpitations, shortness of breath    Order Comments: Instructions: Call provider for temperature greater than 100.4, nausea, vomiting, diarrhea, chest pain, heart palpitations, shortness of breath         Discharge Follow-up with PCP   As directed       Currently Documented PCP:    Job Gamez MD    PCP Phone Number:    [phone]     Follow Up Details: Follow up in 1 week        Discharge Follow-up with Specified Provider: Dr Schroeder (Urology ); 1 Week   As directed      To: Dr Schroeder (Urology )   Follow Up: 1 Week                Time Spent on Discharge:  40 minutes    Electronically signed by LEON Heard, 10/08/23, 8:39 AM EDT.

## 2023-10-08 NOTE — PLAN OF CARE
Problem: Adult Inpatient Plan of Care  Goal: Plan of Care Review  Outcome: Ongoing, Progressing  Flowsheets  Taken 10/8/2023 0327 by Alejandrina Griffin RN  Progress: improving  Outcome Evaluation: Pt. is A&Ox4. Up standby assist to BR. UO appropriate. No stones this shift. Pain controlled w/ PO meds per order. No acute events overnight. RA. VSS.  Taken 10/5/2023 1518 by Parvin Valderrama RN  Plan of Care Reviewed With: patient  Goal: Absence of Hospital-Acquired Illness or Injury  Outcome: Ongoing, Progressing  Intervention: Identify and Manage Fall Risk  Recent Flowsheet Documentation  Taken 10/8/2023 0010 by Alejandrina Griffin RN  Safety Promotion/Fall Prevention:   activity supervised   assistive device/personal items within reach   clutter free environment maintained   elopement precautions   fall prevention program maintained   gait belt   nonskid shoes/slippers when out of bed   room organization consistent   safety round/check completed   toileting scheduled  Taken 10/7/2023 2200 by Alejandrina Griffin RN  Safety Promotion/Fall Prevention:   activity supervised   assistive device/personal items within reach   clutter free environment maintained   elopement precautions   fall prevention program maintained   gait belt   nonskid shoes/slippers when out of bed   room organization consistent   safety round/check completed   toileting scheduled  Taken 10/7/2023 2034 by Alejandrina Griffin RN  Safety Promotion/Fall Prevention:   activity supervised   assistive device/personal items within reach   clutter free environment maintained   elopement precautions   fall prevention program maintained   nonskid shoes/slippers when out of bed   room organization consistent   safety round/check completed   toileting scheduled  Taken 10/7/2023 1947 by Alejandrina Griffin RN  Safety Promotion/Fall Prevention:   activity supervised   assistive device/personal items within reach   clutter free environment maintained   elopement  precautions   fall prevention program maintained   nonskid shoes/slippers when out of bed   room organization consistent   safety round/check completed   toileting scheduled  Intervention: Prevent Skin Injury  Recent Flowsheet Documentation  Taken 10/8/2023 0217 by Alejandrina Griffin RN  Body Position: position changed independently  Taken 10/8/2023 0010 by Alejandrina Griffin RN  Body Position: position changed independently  Taken 10/7/2023 2200 by Alejandrina Griffin RN  Body Position: position changed independently  Skin Protection:   adhesive use limited   tubing/devices free from skin contact  Taken 10/7/2023 2034 by Alejandrina Griffin RN  Body Position: position changed independently  Skin Protection:   adhesive use limited   tubing/devices free from skin contact  Taken 10/7/2023 1947 by Alejandrina Griffin RN  Body Position: position changed independently  Skin Protection:   adhesive use limited   tubing/devices free from skin contact  Intervention: Prevent and Manage VTE (Venous Thromboembolism) Risk  Recent Flowsheet Documentation  Taken 10/8/2023 0217 by Alejandrina Griffin RN  VTE Prevention/Management: patient refused intervention  Taken 10/8/2023 0010 by Alejandrina Griffin RN  VTE Prevention/Management: patient refused intervention  Taken 10/7/2023 2200 by Alejandrina Griffin RN  VTE Prevention/Management: patient refused intervention  Taken 10/7/2023 2034 by Alejandrina Griffin RN  VTE Prevention/Management: patient refused intervention  Taken 10/7/2023 1947 by Alejandrina Griffin RN  Activity Management: ambulated to bathroom  VTE Prevention/Management: patient refused intervention  Intervention: Prevent Infection  Recent Flowsheet Documentation  Taken 10/7/2023 2200 by Alejandrina Griffin RN  Infection Prevention:   environmental surveillance performed   rest/sleep promoted  Taken 10/7/2023 2034 by Alejandrina Griffin RN  Infection Prevention:   rest/sleep promoted   environmental surveillance performed  Taken 10/7/2023  1947 by Alejandrina Griffin, RN  Infection Prevention:   environmental surveillance performed   rest/sleep promoted  Goal: Optimal Comfort and Wellbeing  Outcome: Ongoing, Progressing  Intervention: Monitor Pain and Promote Comfort  Recent Flowsheet Documentation  Taken 10/7/2023 2034 by Alejandrina Griffin RN  Pain Management Interventions: see MAR  Taken 10/7/2023 1947 by Alejandrina Griffin RN  Pain Management Interventions:   position adjusted   pillow support provided  Intervention: Provide Person-Centered Care  Recent Flowsheet Documentation  Taken 10/7/2023 1947 by Alejandrina Griffin RN  Trust Relationship/Rapport:   care explained   thoughts/feelings acknowledged  Goal: Readiness for Transition of Care  Outcome: Ongoing, Progressing   Goal Outcome Evaluation:           Progress: improving  Outcome Evaluation: Pt. is A&Ox4. Up standby assist to BR. UO appropriate. No stones this shift. Pain controlled w/ PO meds per order. No acute events overnight. RA. VSS.

## 2023-10-09 ENCOUNTER — TRANSITIONAL CARE MANAGEMENT TELEPHONE ENCOUNTER (OUTPATIENT)
Dept: CALL CENTER | Facility: HOSPITAL | Age: 43
End: 2023-10-09
Payer: MEDICAID

## 2023-10-09 ENCOUNTER — DOCUMENTATION (OUTPATIENT)
Dept: SOCIAL WORK | Facility: HOSPITAL | Age: 43
End: 2023-10-09
Payer: MEDICAID

## 2023-10-09 NOTE — PROGRESS NOTES
Continued Stay Note       Patient Name: Salma Strickland  MRN: [id number]  Today's Date: 10/9/2023    Admit Date: (Not on file)        Discharge Plan       Row Name 10/09/23 1449       Plan    Plan Comments Completed a prior auth on 10/9/2023 for [employer] for the patient at cover my meds key:[id number] waiting for the decision that could take 24 hours to be completed.                   Discharge Codes    No documentation.                       LAURENCE Pitts

## 2023-10-09 NOTE — OUTREACH NOTE
Call Center TCM Note      Flowsheet Row Responses   [facility] patient discharged from? [place]   Does the patient have one of the following disease processes/diagnoses(primary or secondary)? Sepsis   TCM attempt successful? Yes   Call start time 1100   Call end time 1107   Discharge diagnosis **Sepsis   Comments scheduled hospital f/u with Dr. Lopez at PCP for 10/11   Does the patient have an appointment with their PCP within 7-14 days of discharge? Yes   Has home health visited the patient within 72 hours of discharge? N/A   Psychosocial issues? No   Did the patient receive a copy of their discharge instructions? Yes   Nursing interventions Reviewed instructions with patient   What is the patient's perception of their health status since discharge? Improving   Nursing interventions Nurse provided patient education   Is the patient/caregiver able to teach back TIME? M ental Decline - confused, sleepy, difficult to arouse, I nfection - may have signs and symptoms of an infection, T emperature - higher or lower than normal, E xtremely Ill - severe pain, discomfort, shortness of breath   Nursing interventions Nurse provided patient education   Is patient/caregiver able to teach back steps to recovery at home? Set small, achievable goals for return to baseline health, Rest and regain strength   Is the patient/caregiver able to teach back signs and symptoms of worsening condition: Fever   Is the patient/caregiver able to teach back the hierarchy of who to call/visit for symptoms/problems? PCP, Specialist, Home health nurse, Urgent Care, ED, 911 Yes   TCM call completed? Yes   Wrap up additional comments Doing well, all concerns addressed, scheduled hospital f/u with PCP office for 10/11.   Call end time 1107   Would this patient benefit from a Referral to Amb Social Work? No   Is the patient interested in additional calls from an ambulatory ? No            Jeanne Neves RN    10/9/2023, 11:07  EDT

## 2023-10-11 ENCOUNTER — OFFICE VISIT (OUTPATIENT)
Dept: INTERNAL MEDICINE | Facility: CLINIC | Age: 43
End: 2023-10-11
Payer: MEDICAID

## 2023-10-11 VITALS
RESPIRATION RATE: 16 BRPM | WEIGHT: 211.13 LBS | TEMPERATURE: 97.8 F | DIASTOLIC BLOOD PRESSURE: 78 MMHG | HEART RATE: 72 BPM | SYSTOLIC BLOOD PRESSURE: 122 MMHG | BODY MASS INDEX: 31.18 KG/M2

## 2023-10-11 DIAGNOSIS — N12 PYELONEPHRITIS: Primary | ICD-10-CM

## 2023-10-11 DIAGNOSIS — N20.0 NEPHROLITHIASIS: ICD-10-CM

## 2023-10-11 NOTE — PROGRESS NOTES
Transitional Care Follow Up Visit  Subjective     Chief Complaint   Patient presents with    Hospital Follow Up Visit     TCM- Pyelonephritis         Salma Strickland is a 43 y.o. female who presents for a transitional care management visit.    Within 48 business hours after discharge our office contacted her via telephone to coordinate her care and needs.      I reviewed and discussed the details of that call along with the discharge summary, hospital problems, inpatient lab results, inpatient diagnostic studies, and consultation reports with Salma.     Current outpatient and discharge medications have been reconciled for the patient.  Reviewed by: Nadia Lopez MD          10/8/2023     5:43 PM   Date of TCM Phone Call   [facility]   Date of Admission 10/3/2023   Date of Discharge 10/8/2023   Discharge Disposition Home or Self Care     Risk for Readmission (LACE) No data recorded      History of Present Illness     Course During Hospital Stay: The patient is here for hospital follow-up.  She was admitted to [facility] on 10/3/2023 and discharged on 10/8/2023.  Records have been received and reviewed.  Admitting diagnosis was nephrolithiasis with bacterial pyelonephritis and sepsis.  She also had CHATO.  She presented with bacteremia secondary to left pyelonephritis with obstructing left ureteral stone and hydronephrosis.  She underwent uroscopy and cystoscopy with left ureteral stent placement per Urology on 10/3/23. Blood cultures and urine culture positive for Proteus. ID consulted recommending ceftriaxone, transition to levaquin 750 mg daily for 10 days at discharge.     Labs: Admitting labs: CBC was significant for an elevated white blood cell count (17.13).  H/H was 11.4/36.3.  Lipase, lactate, B12, and folate were normal.  Urinalysis was negative.  Hemoglobin A1c was 6.6.  BMP was significant for an elevated creatinine 1.36 which went to 1.73 later in the day.  Urine  culture grew Proteus.  Blood cultures grew Proteus.  On 10/4/2023, iron and ferritin levels were low.  Discharge labs on 10/8/2023 were significant for a CBC with a low H/H (10.2/31.3), WBC 9.19.  She states she has chronic anemia.  BMP was normal (creatinine 0.94)..    Imaging: On 10/3/2023, CT abdomen/pelvis showed: 3 mm proximal left ureteral stone with delayed left nephrogram and perinephric stranding. The findings are suggestive of left-sided pyelonephritis secondary to an obstructing proximal left ureteral stone.  On 10/5/2023, CT head showed no acute disease.    The patient saw Dr. Schroeder yesterday and reports the stones have not passed.  The  stent in the kidney and in the urethra are still in place and she is still on Levaquin.  She is taking oxycodone once per day and is on Flomax.  She reports intermittent left kidney pain.  She has fever and chills at night.  There is no urinary frequency, urgency, dysuria, or hematuria.  She denies nausea, vomiting, diarrhea, constipation, abdominal pain.       The following portions of the patient's history were reviewed and updated as appropriate: allergies, current medications, past family history, past medical history, past social history, past surgical history, and problem list.    Review of Systems   Constitutional:  Positive for chills, fatigue and fever.        Reports night sweats.   Respiratory:  Positive for cough (occasionl).    Genitourinary:  Positive for flank pain. Negative for dysuria, frequency, hematuria, urgency and vaginal bleeding.   Neurological:  Positive for weakness and headaches.       Objective   Physical Exam  Vitals and nursing note reviewed.   Constitutional:       Appearance: She is well-developed and normal weight.   Cardiovascular:      Rate and Rhythm: Normal rate and regular rhythm.      Heart sounds: Normal heart sounds. No murmur heard.  Pulmonary:      Effort: Pulmonary effort is normal.      Breath sounds: Normal breath sounds.    Abdominal:      General: Bowel sounds are normal. There is no distension.      Palpations: Abdomen is soft. There is no hepatomegaly, splenomegaly or mass.      Tenderness: There is abdominal tenderness (mild left mid abdomen). There is left CVA tenderness. There is no right CVA tenderness, guarding or rebound.   Skin:     Findings: No rash.   Neurological:      Mental Status: She is alert.   Psychiatric:         Mood and Affect: Mood normal.         Assessment & Plan   Diagnoses and all orders for this visit:    1. Pyelonephritis (Primary)   Continue current medication(s) as noted in the history of present illness.   Follow up per Urology.    2. Nephrolithiasis   Continue current medication(s) as noted in the history of present illness.   Follow up per Urology.      Transitional Care Management Certification  I certify that the following are true:  Communication was made within 2 business days of discharge.  Complexity of Medical Decision Making is moderate.  Face to face visit occurred within 3 days.    *Note: 89549 is for high complexity patients with a face to face visit within 7 days of discharge.  53069 is for high complexity patients with a face to face on days 8-14 post discharge or medium complexity with face to face visit within 14 days post discharge.      Return for Annual physical, fasting with Franco.

## 2023-10-20 ENCOUNTER — TELEPHONE (OUTPATIENT)
Dept: INTERNAL MEDICINE | Facility: CLINIC | Age: 43
End: 2023-10-20
Payer: MEDICAID

## 2023-10-20 NOTE — TELEPHONE ENCOUNTER
Spoke with patient. She thanked me for the call back. She stated she will contact the office that is doing her kidney stone removal to see if she needs to repeat the A1C after giving them the results.

## 2023-10-20 NOTE — TELEPHONE ENCOUNTER
Caller: Salma Strickland    Relationship: Self    Best call back number: [phone]     What orders are you requesting (i.e. lab or imaging): FASTING LABS, WILL NEED TO HAVE A1C CHECKED TO CHECK FOR DIABETES     In what timeframe would the patient need to come in: BEFORE 10/26/23    Where will you receive your lab/imaging services: [facility]     Additional notes: PATIENT STATES SHE HAS AN APPOINTMENT TO HAVE A KIDNEY STONE REMOVAL ON 10/26/23 IN THE AFTERNOON AND WOULD LIKE TO HAVE HER LABS FOR HER PHYSICAL SCHEDULED ON 10/27/23 DRAWN BEFORE THE OPERATION.

## 2023-10-20 NOTE — TELEPHONE ENCOUNTER
She recently had her hemoglobin A1c done about 2 weeks ago and it was 6.6.    Is there any reason why she is wanting it repeated?    The only fasting lab that I would consider getting is a cholesterol profile for her.

## 2023-12-27 ENCOUNTER — APPOINTMENT (OUTPATIENT)
Dept: GENERAL RADIOLOGY | Facility: HOSPITAL | Age: 43
End: 2023-12-27
Payer: MEDICAID

## 2023-12-27 ENCOUNTER — HOSPITAL ENCOUNTER (EMERGENCY)
Facility: HOSPITAL | Age: 43
Discharge: HOME | End: 2023-12-27
Attending: EMERGENCY MEDICINE | Admitting: EMERGENCY MEDICINE
Payer: MEDICAID

## 2023-12-27 VITALS
SYSTOLIC BLOOD PRESSURE: 133 MMHG | WEIGHT: 187 LBS | OXYGEN SATURATION: 96 % | BODY MASS INDEX: 27.7 KG/M2 | TEMPERATURE: 100.1 F | HEART RATE: 112 BPM | HEIGHT: 69 IN | RESPIRATION RATE: 18 BRPM | DIASTOLIC BLOOD PRESSURE: 83 MMHG

## 2023-12-27 DIAGNOSIS — J10.1 INFLUENZA A: Primary | ICD-10-CM

## 2023-12-27 LAB
FLUAV RNA RESP QL NAA+PROBE: DETECTED
FLUBV RNA RESP QL NAA+PROBE: NOT DETECTED
RSV RNA NPH QL NAA+NON-PROBE: NOT DETECTED
SARS-COV-2 RNA RESP QL NAA+PROBE: NOT DETECTED

## 2023-12-27 PROCEDURE — 87637 SARSCOV2&INF A&B&RSV AMP PRB: CPT | Performed by: PHYSICIAN ASSISTANT

## 2023-12-27 PROCEDURE — 71045 X-RAY EXAM CHEST 1 VIEW: CPT

## 2023-12-27 PROCEDURE — 99283 EMERGENCY DEPT VISIT LOW MDM: CPT

## 2023-12-27 RX ORDER — ACETAMINOPHEN 325 MG/1
650 TABLET ORAL EVERY 6 HOURS PRN
Status: DISCONTINUED | OUTPATIENT
Start: 2023-12-27 | End: 2023-12-28 | Stop reason: HOSPADM

## 2023-12-27 RX ADMIN — ACETAMINOPHEN 650 MG: 325 TABLET ORAL at 20:52

## 2023-12-28 NOTE — ED PROVIDER NOTES
"Subjective  History of Present Illness:    Chief Complaint: Body aches, fever chills  History of Present Illness: 43-year-old female with sudden onset of bodyaches, fever chills, symptoms for several days  Onset: Gradual onset  Duration: Several days  Exacerbating / Alleviating factors: Worse at night, cough, just, fever, body aches and chills  Associated symptoms: Fatigue      Nurses Notes reviewed and agree, including vitals, allergies, social history and prior medical history.     REVIEW OF SYSTEMS: All systems reviewed and not pertinent unless noted.    Review of Systems   Constitutional:  Positive for chills and fatigue.   HENT:  Positive for congestion.    Respiratory:  Positive for cough.    All other systems reviewed and are negative.      No past medical history on file.    Allergies:    No known drug allergy      Past Surgical History:   Procedure Laterality Date    BACK SURGERY       SECTION      CYSTOSCOPY W/ URETERAL STENT PLACEMENT Left 10/3/2023    Procedure: CYSTOSCOPY URETERAL CATHETER/STENT INSERTION;  Surgeon: Jimmy Schroeder MD;  Location: [facility];  Service: Urology;  Laterality: Left;         Social History     Socioeconomic History    Marital status: Single   Tobacco Use    Smoking status: Never    Smokeless tobacco: Never   Vaping Use    Vaping Use: Never used   Substance and Sexual Activity    Alcohol use: No    Drug use: No    Sexual activity: Defer         Family History   Problem Relation Age of Onset    Heart disease Father        Objective  Physical Exam:  /83 (BP Location: Left arm, Patient Position: Sitting)   Pulse 112   Temp 100.1 °F (37.8 °C) (Oral)   Resp 18   Ht 175.3 cm (69\")   Wt 84.8 kg (187 lb)   LMP  (LMP Unknown) Comment: iud in place  SpO2 96%   BMI 27.62 kg/m²      Physical Exam  Vitals and nursing note reviewed.   Constitutional:       Appearance: She is well-developed.   Cardiovascular:      Rate and Rhythm: Normal rate and regular rhythm. "   Pulmonary:      Effort: Pulmonary effort is normal.      Breath sounds: Normal breath sounds.   Abdominal:      General: Bowel sounds are normal.      Palpations: Abdomen is soft.   Musculoskeletal:         General: Normal range of motion.      Cervical back: Normal range of motion and neck supple.   Skin:     General: Skin is warm and dry.   Neurological:      Mental Status: She is alert and oriented to person, place, and time.      Deep Tendon Reflexes: Reflexes are normal and symmetric.           Procedures    ED Course:         Lab Results (last 24 hours)       Procedure Component Value Units Date/Time    COVID PRE-OP / PRE-PROCEDURE SCREENING ORDER (NO ISOLATION) - Swab, Nasopharynx [[id number]]  (Abnormal) Collected: 12/27/23 2053    Specimen: Swab from Nasopharynx Updated: 12/27/23 2150    Narrative:      The following orders were created for panel order COVID PRE-OP / PRE-PROCEDURE SCREENING ORDER (NO ISOLATION) - Swab, Nasopharynx.  Procedure                               Abnormality         Status                     ---------                               -----------         ------                     COVID-19, FLU A/B, RSV P...[[id number]]  Abnormal            Final result                 Please view results for these tests on the individual orders.    COVID-19, FLU A/B, RSV PCR 1 HR TAT - Swab, Nasopharynx [[id number]]  (Abnormal) Collected: 12/27/23 2053    Specimen: Swab from Nasopharynx Updated: 12/27/23 2150     COVID19 Not Detected     Influenza A PCR Detected     Influenza B PCR Not Detected     RSV, PCR Not Detected    Narrative:      Fact sheet for providers: https://www.fda.gov/media/841741/download    Fact sheet for patients: https://www.fda.gov/media/486475/download    Test performed by PCR.             XR Chest 1 View    Result Date: 12/27/2023  XR CHEST 1 VW Date of Exam: 12/27/2023 8:39 PM EST Indication: cough fever Comparison: None available. Findings: Cardiomediastinal silhouette is  within normal limits. Mild left hemidiaphragm elevation. No focal consolidation or overt pulmonary edema. No pleural effusion or pneumothorax. Thoracolumbar spinal fusion hardware.     Impression: Impression: No evidence of acute cardiopulmonary disease. Electronically Signed: Rosalio Quintero MD  12/27/2023 9:04 PM EST  Workstation ID: [id number]        Medical Decision Making  Problems Addressed:  Influenza A: complicated acute illness or injury    Amount and/or Complexity of Data Reviewed  Radiology: ordered.    Risk  OTC drugs.          Final diagnoses:   Influenza A          Jimy Roman Jr., PA-C  12/27/23 3425

## (undated) DEVICE — NITINOL WIRE WITH HYDROPHILIC TIP: Brand: SENSOR

## (undated) DEVICE — CATH URETRL FLXITP POLLACK STD 5F 70CM

## (undated) DEVICE — BLANKT WARM UPPR/BDY ARM/OUT 57X196CM

## (undated) DEVICE — PK CYSTO-TUR BASIC 10

## (undated) DEVICE — GLV SURG SENSICARE W/ALOE PF LF 7.5 STRL